# Patient Record
Sex: FEMALE | Race: AMERICAN INDIAN OR ALASKA NATIVE
[De-identification: names, ages, dates, MRNs, and addresses within clinical notes are randomized per-mention and may not be internally consistent; named-entity substitution may affect disease eponyms.]

---

## 2017-06-20 ENCOUNTER — HOSPITAL ENCOUNTER (EMERGENCY)
Dept: HOSPITAL 5 - ED | Age: 67
Discharge: LEFT BEFORE DISCHARGE | End: 2017-06-20
Payer: MEDICARE

## 2017-06-20 VITALS — DIASTOLIC BLOOD PRESSURE: 101 MMHG | SYSTOLIC BLOOD PRESSURE: 155 MMHG

## 2017-06-20 DIAGNOSIS — Z53.21: ICD-10-CM

## 2017-06-20 DIAGNOSIS — M54.9: Primary | ICD-10-CM

## 2017-06-20 PROCEDURE — 93005 ELECTROCARDIOGRAM TRACING: CPT

## 2017-06-20 PROCEDURE — 93010 ELECTROCARDIOGRAM REPORT: CPT

## 2017-06-20 NOTE — EMERGENCY DEPARTMENT REPORT
Chief Complaint: Back Pain/Injury


Stated Complaint: BACK PAIN


Time Seen by Provider: 06/20/17 14:24





- HPI


History of Present Illness: 





PT reports left sided back pain x a few days.  PT also requesting that an area 

in her "privates" be checked.  





- ROS


Review of Systems: 





+ back pain


- dysuria 





- Exam


Physical Exam: 





PT looks well, non toxic


steady gait


no vertebral tenderness, no cva tenderness danielle 


MSE screening note: 


Focused history and physical exam performed.


Due to findings the following was ordered:











ED Disposition for MSE


Condition: Stable

## 2017-06-29 ENCOUNTER — HOSPITAL ENCOUNTER (EMERGENCY)
Dept: HOSPITAL 5 - ED | Age: 67
LOS: 1 days | Discharge: PSYCHIATRIC OR SUBSTANCE USE FACILITY | End: 2017-06-30
Payer: MEDICARE

## 2017-06-29 DIAGNOSIS — F31.9: ICD-10-CM

## 2017-06-29 DIAGNOSIS — R44.0: ICD-10-CM

## 2017-06-29 DIAGNOSIS — I10: ICD-10-CM

## 2017-06-29 DIAGNOSIS — F20.9: ICD-10-CM

## 2017-06-29 DIAGNOSIS — F23: Primary | ICD-10-CM

## 2017-06-29 PROCEDURE — 80048 BASIC METABOLIC PNL TOTAL CA: CPT

## 2017-06-29 PROCEDURE — 85025 COMPLETE CBC W/AUTO DIFF WBC: CPT

## 2017-06-29 PROCEDURE — 36415 COLL VENOUS BLD VENIPUNCTURE: CPT

## 2017-06-29 PROCEDURE — G0480 DRUG TEST DEF 1-7 CLASSES: HCPCS

## 2017-06-29 PROCEDURE — 81001 URINALYSIS AUTO W/SCOPE: CPT

## 2017-06-29 PROCEDURE — 80320 DRUG SCREEN QUANTALCOHOLS: CPT

## 2017-06-29 PROCEDURE — 80307 DRUG TEST PRSMV CHEM ANLYZR: CPT

## 2017-06-29 PROCEDURE — 99284 EMERGENCY DEPT VISIT MOD MDM: CPT

## 2017-06-30 VITALS — SYSTOLIC BLOOD PRESSURE: 124 MMHG | DIASTOLIC BLOOD PRESSURE: 76 MMHG

## 2017-06-30 LAB
ANION GAP SERPL CALC-SCNC: 20 MMOL/L
BASOPHILS NFR BLD AUTO: 0.7 % (ref 0–1.8)
BILIRUB UR QL STRIP: (no result)
BLOOD UR QL VISUAL: (no result)
BUN SERPL-MCNC: 36 MG/DL (ref 7–17)
BUN/CREAT SERPL: 32.72 %
CALCIUM SERPL-MCNC: 9.7 MG/DL (ref 8.4–10.2)
CHLORIDE SERPL-SCNC: 105.4 MMOL/L (ref 98–107)
CO2 SERPL-SCNC: 24 MMOL/L (ref 22–30)
EOSINOPHIL NFR BLD AUTO: 2.7 % (ref 0–4.3)
GLUCOSE SERPL-MCNC: 83 MG/DL (ref 65–100)
HCT VFR BLD CALC: 34.5 % (ref 30.3–42.9)
HGB BLD-MCNC: 11.4 GM/DL (ref 10.1–14.3)
KETONES UR STRIP-MCNC: 20 MG/DL
LEUKOCYTE ESTERASE UR QL STRIP: (no result)
MCH RBC QN AUTO: 31 PG (ref 28–32)
MCHC RBC AUTO-ENTMCNC: 33 % (ref 30–34)
MCV RBC AUTO: 95 FL (ref 79–97)
MUCOUS THREADS #/AREA URNS HPF: (no result) /HPF
NITRITE UR QL STRIP: (no result)
PH UR STRIP: 5 [PH] (ref 5–7)
PLATELET # BLD: 211 K/MM3 (ref 140–440)
POTASSIUM SERPL-SCNC: 4.2 MMOL/L (ref 3.6–5)
PROT UR STRIP-MCNC: (no result) MG/DL
RBC # BLD AUTO: 3.66 M/MM3 (ref 3.65–5.03)
RBC #/AREA URNS HPF: 2 /HPF (ref 0–6)
SODIUM SERPL-SCNC: 145 MMOL/L (ref 137–145)
URINE DRUGS OF ABUSE NOTE: (no result)
UROBILINOGEN UR-MCNC: 2 MG/DL (ref ?–2)
WBC # BLD AUTO: 5.3 K/MM3 (ref 4.5–11)
WBC #/AREA URNS HPF: 13 /HPF (ref 0–6)

## 2017-06-30 NOTE — EMERGENCY DEPARTMENT REPORT
ED Psych HPI





- General


Chief Complaint: Psych


Stated Complaint: MH EVAL/HIGH BP


Time Seen by Provider: 06/30/17 02:53


Source: patient


Mode of arrival: Ambulatory





- History of Present Illness


Initial Comments: 





Pt is a 67 yr old female with a h/o schizophrenia and bipolar disorder 

noncompliant on meds presenting with acute psychosis with daughter. Currently 

the patient lives alone and has been hearing voices, which she reports is the 

devil, telling her to do things and telling her the devil is coming for her 

children. As per daughter the daughter has been wandering around outside for 

hours and recently called her daughter at 5am and left the house b/c the devil 

told her to leave the house. Pt denies any SI/HI. Pt lives alone and 30 minutes 

from her daughter. Pt has no other complaints





- Related Data


 Allergies











Allergy/AdvReac Type Severity Reaction Status Date / Time


 


No Known Allergies Allergy   Verified 06/20/17 14:29














ED Review of Systems


ROS: 


Stated complaint: MH EVAL/HIGH BP


Other details as noted in HPI





Comment: All other systems reviewed and negative





ED Past Medical Hx





- Past Medical History


Previous Medical History?: Yes


Hx Hypertension: Yes


Hx Psychiatric Treatment: Yes (SCHIZO/BIPOLAR)





- Surgical History


Past Surgical History?: No





- Social History


Smoking Status: Never Smoker


Substance Use Type: None





ED Physical Exam





- General


Limitations: No Limitations


General appearance: alert, in no apparent distress





- Head


Head exam: Present: atraumatic, normocephalic





- Eye


Eye exam: Present: normal appearance





- ENT


ENT exam: Present: mucous membranes moist





- Neck


Neck exam: Present: normal inspection





- Respiratory


Respiratory exam: Present: normal lung sounds bilaterally.  Absent: respiratory 

distress





- Cardiovascular


Cardiovascular Exam: Present: regular rate, normal rhythm.  Absent: systolic 

murmur, diastolic murmur, rubs, gallop





- GI/Abdominal


GI/Abdominal exam: Present: soft, normal bowel sounds





- Extremities Exam


Extremities exam: Present: normal inspection





- Back Exam


Back exam: Present: normal inspection





- Neurological Exam


Neurological exam: Present: alert, oriented X3





- Psychiatric


Psychiatric exam: Present: normal mood, flat affect, other (hearing voices, 

acutely psychotic)





- Skin


Skin exam: Present: warm, dry, intact, normal color.  Absent: rash





ED Course





 Vital Signs











  06/29/17





  23:08


 


Temperature 98.1 F


 


Pulse Rate 65


 


Respiratory 18





Rate 


 


Blood Pressure 131/87


 


O2 Sat by Pulse 98





Oximetry 














ED Medical Decision Making





- Lab Data


Result diagrams: 


 06/30/17 00:10





 06/30/17 00:10





- Medical Decision Making





Pt made 1013


Critical care attestation.: 


If time is entered above; I have spent that time in minutes in the direct care 

of this critically ill patient, excluding procedure time.








ED Disposition


Condition: Stable


Referrals: 


PRIMARY CARE,MD [Primary Care Provider] - 3-5 Days

## 2019-06-28 ENCOUNTER — HOSPITAL ENCOUNTER (INPATIENT)
Dept: HOSPITAL 5 - ED | Age: 69
LOS: 3 days | Discharge: HOME | DRG: 308 | End: 2019-07-01
Attending: INTERNAL MEDICINE | Admitting: INTERNAL MEDICINE
Payer: MEDICARE

## 2019-06-28 DIAGNOSIS — N18.9: ICD-10-CM

## 2019-06-28 DIAGNOSIS — I12.9: ICD-10-CM

## 2019-06-28 DIAGNOSIS — Z86.73: ICD-10-CM

## 2019-06-28 DIAGNOSIS — Z96.659: ICD-10-CM

## 2019-06-28 DIAGNOSIS — R00.1: ICD-10-CM

## 2019-06-28 DIAGNOSIS — I48.91: Primary | ICD-10-CM

## 2019-06-28 DIAGNOSIS — N17.0: ICD-10-CM

## 2019-06-28 DIAGNOSIS — F31.9: ICD-10-CM

## 2019-06-28 DIAGNOSIS — R07.9: ICD-10-CM

## 2019-06-28 LAB
APTT BLD: 24.4 SEC. (ref 24.2–36.6)
BASOPHILS # (AUTO): 0 K/MM3 (ref 0–0.1)
BASOPHILS # (AUTO): 0 K/MM3 (ref 0–0.1)
BASOPHILS NFR BLD AUTO: 0.2 % (ref 0–1.8)
BASOPHILS NFR BLD AUTO: 0.4 % (ref 0–1.8)
BUN SERPL-MCNC: 16 MG/DL (ref 7–17)
BUN SERPL-MCNC: 18 MG/DL (ref 7–17)
BUN/CREAT SERPL: 11 %
BUN/CREAT SERPL: 11 %
CALCIUM SERPL-MCNC: 9.3 MG/DL (ref 8.4–10.2)
CALCIUM SERPL-MCNC: 9.8 MG/DL (ref 8.4–10.2)
EOSINOPHIL # BLD AUTO: 0 K/MM3 (ref 0–0.4)
EOSINOPHIL # BLD AUTO: 0 K/MM3 (ref 0–0.4)
EOSINOPHIL NFR BLD AUTO: 0 % (ref 0–4.3)
EOSINOPHIL NFR BLD AUTO: 0.2 % (ref 0–4.3)
HCT VFR BLD CALC: 30.7 % (ref 30.3–42.9)
HCT VFR BLD CALC: 33.9 % (ref 30.3–42.9)
HDLC SERPL-MCNC: 50 MG/DL (ref 40–59)
HEMOLYSIS INDEX: 26
HEMOLYSIS INDEX: 40
HGB BLD-MCNC: 11 GM/DL (ref 10.1–14.3)
HGB BLD-MCNC: 11.7 GM/DL (ref 10.1–14.3)
INR PPP: 1.06 (ref 0.87–1.13)
LYMPHOCYTES # BLD AUTO: 0.8 K/MM3 (ref 1.2–5.4)
LYMPHOCYTES # BLD AUTO: 1.2 K/MM3 (ref 1.2–5.4)
LYMPHOCYTES NFR BLD AUTO: 10.1 % (ref 13.4–35)
LYMPHOCYTES NFR BLD AUTO: 13.4 % (ref 13.4–35)
MCHC RBC AUTO-ENTMCNC: 34 % (ref 30–34)
MCHC RBC AUTO-ENTMCNC: 36 % (ref 30–34)
MCV RBC AUTO: 94 FL (ref 79–97)
MCV RBC AUTO: 94 FL (ref 79–97)
MONOCYTES # (AUTO): 0.5 K/MM3 (ref 0–0.8)
MONOCYTES # (AUTO): 0.6 K/MM3 (ref 0–0.8)
MONOCYTES % (AUTO): 6.1 % (ref 0–7.3)
MONOCYTES % (AUTO): 6.1 % (ref 0–7.3)
PLATELET # BLD: 257 K/MM3 (ref 140–440)
PLATELET # BLD: 291 K/MM3 (ref 140–440)
RBC # BLD AUTO: 3.28 M/MM3 (ref 3.65–5.03)
RBC # BLD AUTO: 3.62 M/MM3 (ref 3.65–5.03)
T4 FREE SERPL-MCNC: 1.14 NG/DL (ref 0.76–1.46)

## 2019-06-28 PROCEDURE — 93010 ELECTROCARDIOGRAM REPORT: CPT

## 2019-06-28 PROCEDURE — 83880 ASSAY OF NATRIURETIC PEPTIDE: CPT

## 2019-06-28 PROCEDURE — 36415 COLL VENOUS BLD VENIPUNCTURE: CPT

## 2019-06-28 PROCEDURE — 93005 ELECTROCARDIOGRAM TRACING: CPT

## 2019-06-28 PROCEDURE — 83735 ASSAY OF MAGNESIUM: CPT

## 2019-06-28 PROCEDURE — 82550 ASSAY OF CK (CPK): CPT

## 2019-06-28 PROCEDURE — 78452 HT MUSCLE IMAGE SPECT MULT: CPT

## 2019-06-28 PROCEDURE — 80061 LIPID PANEL: CPT

## 2019-06-28 PROCEDURE — 71045 X-RAY EXAM CHEST 1 VIEW: CPT

## 2019-06-28 PROCEDURE — 80048 BASIC METABOLIC PNL TOTAL CA: CPT

## 2019-06-28 PROCEDURE — 84443 ASSAY THYROID STIM HORMONE: CPT

## 2019-06-28 PROCEDURE — A9502 TC99M TETROFOSMIN: HCPCS

## 2019-06-28 PROCEDURE — 93017 CV STRESS TEST TRACING ONLY: CPT

## 2019-06-28 PROCEDURE — 96365 THER/PROPH/DIAG IV INF INIT: CPT

## 2019-06-28 PROCEDURE — 85610 PROTHROMBIN TIME: CPT

## 2019-06-28 PROCEDURE — 82962 GLUCOSE BLOOD TEST: CPT

## 2019-06-28 PROCEDURE — 93306 TTE W/DOPPLER COMPLETE: CPT

## 2019-06-28 PROCEDURE — 70450 CT HEAD/BRAIN W/O DYE: CPT

## 2019-06-28 PROCEDURE — 82553 CREATINE MB FRACTION: CPT

## 2019-06-28 PROCEDURE — 84484 ASSAY OF TROPONIN QUANT: CPT

## 2019-06-28 PROCEDURE — 85025 COMPLETE CBC W/AUTO DIFF WBC: CPT

## 2019-06-28 PROCEDURE — 84439 ASSAY OF FREE THYROXINE: CPT

## 2019-06-28 PROCEDURE — 85730 THROMBOPLASTIN TIME PARTIAL: CPT

## 2019-06-28 RX ADMIN — ACETAMINOPHEN PRN MG: 325 TABLET ORAL at 20:30

## 2019-06-28 RX ADMIN — Medication SCH ML: at 22:04

## 2019-06-28 RX ADMIN — FAMOTIDINE SCH MG: 20 TABLET ORAL at 21:59

## 2019-06-28 NOTE — XRAY REPORT
PROCEDURE: Chest. 

 

TECHNIQUE: Portable AP view. 

 

HISTORY: Chest pain. 

 

COMPARISONS: None. 

 

FINDINGS: 

 

The heart size is normal. There is mild tortuosity and calcification in the thoracic aorta. The lungs
 are clear and well expanded. There are no pleural effusions. The soft tissues and regional skeleton 
are unremarkable. 

 

IMPRESSION:  No evidence of acute disease. 

 

This document is electronically signed by Blaine Arambula MD., June 28 2019 07:05:08 PM ET

## 2019-06-28 NOTE — EMERGENCY DEPARTMENT REPORT
HPI





- General


Chief Complaint: Chest Pain


Time Seen by Provider: 06/28/19 17:02





- HPI


HPI: 





Room 22








The patient is a 69-year-old female presented with a chief complaint of chest 

pain and palpitations.  The patient states prior to arrival she developed bl

urred vision and bilateral shoulder pain.  Patient states then developed sharp 

chest pain associated with palpitations and shortness of breath.  Patient admits

to diaphoresis with her chest pain but denies nausea or vomiting.  Patient 

admits to headache but denies paresthesia.  Patient also complains of cramping 

in both legs.








Location: [See above]


Duration: [See above]


Quality:  [See above]


Severity:  [See above]


Modifying factors: [see above]


Context: [see above]


Mode of transportation: [not driving]





ED Past Medical Hx





- Past Medical History


Previous Medical History?: Yes


Hx Hypertension: Yes


Hx Psychiatric Treatment: Yes (SCHIZO/BIPOLAR)





- Surgical History


Additional Surgical History: Uterine prolapse repair





- Family History


Family history: no significant





- Social History


Smoking Status: Never Smoker


Substance Use Type: None





- Medications


Home Medications: 


                                Home Medications











 Medication  Instructions  Recorded  Confirmed  Last Taken  Type


 


Unobtainable  06/30/17 06/30/17 Unknown History














ED Review of Systems


ROS: 


Stated complaint: CHEST PAIN


Other details as noted in HPI





Constitutional: diaphoresis


Eyes: vision change


ENT: denies: throat pain


Respiratory: shortness of breath


Cardiovascular: chest pain, palpitations


Endocrine: no symptoms reported


Gastrointestinal: abdominal pain.  denies: nausea, vomiting


Genitourinary: denies: dysuria


Musculoskeletal: arthralgia


Neurological: headache





Physical Exam





- Physical Exam


Vital Signs: 


                                   Vital Signs











  06/28/19





  16:55


 


Temperature 98.2 F


 


Pulse Rate 129 H


 


Respiratory 16





Rate 


 


Blood Pressure 110/78





[Right] 


 


O2 Sat by Pulse 98





Oximetry 











Physical Exam: 





GENERAL: The patient is well-developed well-nourished female lying on stretcher 

not appearing to be in acute distress. []


HEENT: Normocephalic.  Atraumatic.  Extraocular motions are intact.  Patient has

 moist mucous membranes.


NECK: Supple.  Trachea midline


CHEST/LUNGS: Clear to auscultation.  There is no respiratory distress noted.


HEART/CARDIOVASCULAR: Irregularly irregular.  There is tachycardia.  There is no

 gallop rub or murmur.


ABDOMEN: Abdomen is soft, nontender.  Patient has normal bowel sounds.  There is

 no abdominal distention.


SKIN: There is no rash.  There is no edema.  There is no diaphoresis.


NEURO: The patient is awake, alert, and oriented.  The patient is cooperative.  

The patient has no focal neurologic deficits.  The patient has normal speech.  

Cranial nerves II through XII grossly intact, no drift


MUSCULOSKELETAL: There is no evidence of acute injury.





ED Course


                                   Vital Signs











  06/28/19





  16:55


 


Temperature 98.2 F


 


Pulse Rate 129 H


 


Respiratory 16





Rate 


 


Blood Pressure 110/78





[Right] 


 


O2 Sat by Pulse 98





Oximetry 














ED Medical Decision Making





- Lab Data


Result diagrams: 


                                 06/28/19 17:35





                                 06/28/19 17:35





                                Laboratory Tests











  06/28/19 06/28/19 06/28/19





  17:35 17:35 17:35


 


WBC  8.2  


 


RBC  3.28 L  


 


Hgb  11.0  


 


Hct  30.7  


 


MCV  94  


 


MCH  34 H  


 


MCHC  36 H  


 


RDW  14.1  


 


Plt Count  257  


 


Lymph % (Auto)  10.1 L  


 


Mono % (Auto)  6.1  


 


Eos % (Auto)  0.0  


 


Baso % (Auto)  0.2  


 


Lymph #  0.8 L  


 


Mono #  0.5  


 


Eos #  0.0  


 


Baso #  0.0  


 


Seg Neutrophils %  83.6 H  


 


Seg Neutrophils #  6.9  


 


PT   13.5 


 


INR   1.06 


 


APTT   24.4 


 


Sodium    143


 


Potassium    3.6


 


Chloride    107.6 H


 


Carbon Dioxide    22


 


Anion Gap    17


 


BUN    18 H


 


Creatinine    1.7 H


 


Estimated GFR    36


 


BUN/Creatinine Ratio    11


 


Glucose    96


 


Calcium    9.3


 


Magnesium    1.60 L


 


Total Creatine Kinase    455 H


 


CK-MB (CK-2)    8.5 H


 


CK-MB (CK-2) Rel Index    1.8


 


Troponin T    0.065 H


 


NT-Pro-B Natriuret Pep    410.0


 


TSH   


 


Free T4   














  06/28/19





  17:35


 


WBC 


 


RBC 


 


Hgb 


 


Hct 


 


MCV 


 


MCH 


 


MCHC 


 


RDW 


 


Plt Count 


 


Lymph % (Auto) 


 


Mono % (Auto) 


 


Eos % (Auto) 


 


Baso % (Auto) 


 


Lymph # 


 


Mono # 


 


Eos # 


 


Baso # 


 


Seg Neutrophils % 


 


Seg Neutrophils # 


 


PT 


 


INR 


 


APTT 


 


Sodium 


 


Potassium 


 


Chloride 


 


Carbon Dioxide 


 


Anion Gap 


 


BUN 


 


Creatinine 


 


Estimated GFR 


 


BUN/Creatinine Ratio 


 


Glucose 


 


Calcium 


 


Magnesium 


 


Total Creatine Kinase 


 


CK-MB (CK-2) 


 


CK-MB (CK-2) Rel Index 


 


Troponin T 


 


NT-Pro-B Natriuret Pep 


 


TSH  1.900


 


Free T4  1.14














- EKG Data


-: EKG Interpreted by Me


Rate: tachycardia (143 bpm)





- EKG Data


When compared to previous EKG there are: previous EKG unavailable


Interpretation: other (atrial fibrillation with a rapid ventricular response)





06/28/19 18:48


EKG #2 reveals normal sinus rhythm at 82 bpm





- Radiology Data


Radiology results: image reviewed (chest x-ray)


interpreted by me: 





Chest x-ray-no focal infiltrates, no pneumothorax





- Differential Diagnosis


atrial fibrillation, ACS, pericarditis


Critical care attestation.: 


If time is entered above; I have spent that time in minutes in the direct care 

of this critically ill patient, excluding procedure time.








ED Disposition


Clinical Impression: 


 Chest pain, Atrial fibrillation with rapid ventricular response, Elevated 

troponin





Disposition: DC-09 OP ADMIT IP TO THIS HOSP


Is pt being admited?: Yes


Does the pt Need Aspirin: Yes


Condition: Fair


Instructions:  Chest Pain (ED)


Time of Disposition: 18:51 (hospitalist paged (Dr Perla))

## 2019-06-28 NOTE — HISTORY AND PHYSICAL REPORT
History of Present Illness


Date of examination: 06/28/19


Date of admission: 


06/28/2019


Chief complaint: 





Chest pain and palpitation


History of present illness: 





patient is a 69-year-old female with PMHx of hypertension, renal disease, 

stroke, bipolar disorder who presents to the ER with complaint of chest pain and

palpitations.  Patient states that her chest pain is an intermittent sharp pain 

associated with shortness of breath and palpitation, the pain is located on the 

midsternal area radiating to both shoulder and her back. She also reports 

blurred vision, seeing floaters and bilateral arm numbness and tingling and leg 

cramps. Patient reports diaphoresis and  headache, she denies nausea or 

vomiting, denies dizziness, denies headache, denies numbness.  In the ER 

patient's EKG showed A. fib with RVR, her heart rate range between, her symptoms

resolve, before the Cardizem drip started she converted to sinus rhythm patient 

was admitted for further evaluation and treatments.





Past History


Past Medical History: hypertension, stroke


Past Surgical History: No surgical history


Social history: no significant social history


Family history: no significant family history





Medications and Allergies


                                    Allergies











Allergy/AdvReac Type Severity Reaction Status Date / Time


 


No Known Allergies Allergy   Verified 06/20/17 14:29











                                Home Medications











 Medication  Instructions  Recorded  Confirmed  Last Taken  Type


 


Paliperidone Palmitate [Invega 117 mg IM [id number] 06/29/19 06/29/19 06/13/19 

History





Sustenna]     











Active Meds: 


Active Medications





Diltiazem HCl (Cardizem/D5w 100mg/100ml)  100 mg in 100 mls @ 5 mls/hr IV TITR 

YOSEF; Protocol











Review of Systems


Cardiovascular: chest pain, palpitations





Exam





- Constitutional


Vitals: 


                                        











Temp Pulse Resp BP Pulse Ox


 


 98.2 F   75   12   150/92   99 


 


 06/28/19 16:55  06/28/19 20:00  06/28/19 20:00  06/28/19 20:00  06/28/19 20:00











General appearance: Present: no acute distress





- EENT


Eyes: Present: EOM intact


ENT: hearing intact





- Respiratory


Respiratory: negative: CTA





- Cardiovascular


Rhythm: regular


Heart Sounds: Present: S1 & S2





- Extremities


Extremities: no ischemia, pulses intact, No edema, normal color, Full ROM


Peripheral Pulses: within normal limits





- Abdominal


General gastrointestinal: Present: non-tender, non-distended


Female genitourinary: Present: deferred





- Rectal


Rectal Exam: deferred





- Integumentary


Integumentary: Present: warm, dry





- Musculoskeletal


Musculoskeletal: strength equal bilaterally





- Psychiatric


Psychiatric: appropriate mood/affect





- Neurologic


Neurologic: moves all extremities





Results





- Labs


CBC & Chem 7: 


                                 06/28/19 20:31





                                 06/28/19 20:31


Labs: 


                             Laboratory Last Values











WBC  8.2 K/mm3 (4.5-11.0)   06/28/19  17:35    


 


RBC  3.28 M/mm3 (3.65-5.03)  L  06/28/19  17:35    


 


Hgb  11.0 gm/dl (10.1-14.3)   06/28/19  17:35    


 


Hct  30.7 % (30.3-42.9)   06/28/19  17:35    


 


MCV  94 fl (79-97)   06/28/19  17:35    


 


MCH  34 pg (28-32)  H  06/28/19  17:35    


 


MCHC  36 % (30-34)  H  06/28/19  17:35    


 


RDW  14.1 % (13.2-15.2)   06/28/19  17:35    


 


Plt Count  257 K/mm3 (140-440)   06/28/19  17:35    


 


Lymph % (Auto)  10.1 % (13.4-35.0)  L  06/28/19  17:35    


 


Mono % (Auto)  6.1 % (0.0-7.3)   06/28/19  17:35    


 


Eos % (Auto)  0.0 % (0.0-4.3)   06/28/19  17:35    


 


Baso % (Auto)  0.2 % (0.0-1.8)   06/28/19  17:35    


 


Lymph #  0.8 K/mm3 (1.2-5.4)  L  06/28/19  17:35    


 


Mono #  0.5 K/mm3 (0.0-0.8)   06/28/19  17:35    


 


Eos #  0.0 K/mm3 (0.0-0.4)   06/28/19  17:35    


 


Baso #  0.0 K/mm3 (0.0-0.1)   06/28/19  17:35    


 


Seg Neutrophils %  83.6 % (40.0-70.0)  H  06/28/19  17:35    


 


Seg Neutrophils #  6.9 K/mm3 (1.8-7.7)   06/28/19  17:35    


 


PT  13.5 Sec. (12.2-14.9)   06/28/19  17:35    


 


INR  1.06  (0.87-1.13)   06/28/19  17:35    


 


APTT  24.4 Sec. (24.2-36.6)   06/28/19  17:35    


 


Sodium  143 mmol/L (137-145)   06/28/19  17:35    


 


Potassium  3.6 mmol/L (3.6-5.0)   06/28/19  17:35    


 


Chloride  107.6 mmol/L ()  H  06/28/19  17:35    


 


Carbon Dioxide  22 mmol/L (22-30)   06/28/19  17:35    


 


  17 mmol/L  06/28/19  17:35    


 


BUN  18 mg/dL (7-17)  H  06/28/19  17:35    


 


  1.7 mg/dL (0.7-1.2)  H  06/28/19  17:35    


 


Estimated GFR  36 ml/min  06/28/19  17:35    


 


  11 %  06/28/19  17:35    


 


Glucose  96 mg/dL ()   06/28/19  17:35    


 


Calcium  9.3 mg/dL (8.4-10.2)   06/28/19  17:35    


 


Magnesium  1.60 mg/dL (1.7-2.3)  L  06/28/19  17:35    


 


  455 units/L ()  H  06/28/19  17:35    


 


CK-MB (CK-2)  8.5 ng/mL (0.0-4.0)  H  06/28/19  17:35    


 


CK-MB (CK-2) Rel Index  1.8  (0-4)   06/28/19  17:35    


 


  0.065 ng/mL (0.00-0.029)  H  06/28/19  17:35    


 


NT-Pro-B Natriuret Pep  410.0 pg/mL (0-900)   06/28/19  17:35    


 


Triglycerides  57 mg/dL (2-149)   06/28/19  17:35    


 


Cholesterol  135 mg/dL ()   06/28/19  17:35    


 


  83 mg/dL ()   06/28/19  17:35    


 


  50 mg/dL (40-59)   06/28/19  17:35    


 


  2.70 %  06/28/19  17:35    


 


TSH  1.900 mlU/mL (0.270-4.200)   06/28/19  17:35    


 


Free T4  1.14 ng/dL (0.76-1.46)   06/28/19  17:35    














Assessment and Plan


Assessment and plan: 





1.  A. fib with RVR (resolved)


2.  Chest pain (likely due to A. fib)


3.  Headache


4.  Accelerated Hypertension


5.  History of stroke


6.  History of kidney disease


7.  Bipolar disorder





Plan:


Patient is admitted to med telemetry for A. fib


Consult cardiology for evaluation


CT of the brain for headache


Hydralazine PRN for hypertension


Monitor neuro status


Resume home meds


DVT prophylaxis with Lovenox


Further plan per hospital course 


Advance Directives: Yes


Contraindication Mechanical VTE Prophylaxis: Contraindicated


Plan of care discussed with patient/family: Yes

## 2019-06-29 RX ADMIN — Medication SCH ML: at 10:55

## 2019-06-29 RX ADMIN — Medication SCH ML: at 22:01

## 2019-06-29 RX ADMIN — ENOXAPARIN SODIUM SCH MG: 100 INJECTION SUBCUTANEOUS at 10:55

## 2019-06-29 RX ADMIN — FAMOTIDINE SCH MG: 20 TABLET ORAL at 10:55

## 2019-06-29 RX ADMIN — FAMOTIDINE SCH MG: 20 TABLET ORAL at 22:01

## 2019-06-29 NOTE — PROGRESS NOTE
Assessment and Plan


Assessment and plan: 





A. fib with RVR.  Cont. tele monitoring.  F/U ECHO.  Cardiology following





Chest pain.  F/U stress test





Accelerated Hypertension.  Cont current antihypertensive meds





History of stroke





History of kidney disease





Bipolar disorder








History


Interval history: 





Ms. Bose is a 68 y/o  female with a history of 

hypertension, stroke and bipolar disorder who presented to the ED with chest 

pain she experienced after moving from one residence to another on Friday.   On 

arrival to the ED, she was in atrial fibrillation with RVR, which converted to 

SR after administration of diltiazem.  Her initial troponin was .065.





Hospitalist Physical





- Constitutional


Vitals: 


                                        











Temp Pulse Resp BP Pulse Ox


 


 97.7 F   61   18   160/94   100 


 


 06/29/19 11:17  06/29/19 11:17  06/29/19 11:17  06/29/19 11:17  06/29/19 11:17











General appearance: Present: no acute distress





- EENT


Eyes: Present: PERRL, EOM intact


ENT: hearing intact, clear oral mucosa, dentition normal





- Neck


Neck: Present: supple, normal ROM





- Respiratory


Respiratory effort: normal


Respiratory: bilateral: CTA





- Cardiovascular


Rhythm: regular


Heart Sounds: Present: S1 & S2.  Absent: gallop, rub





- Extremities


Extremities: no ischemia, No edema, Full ROM





- Abdominal


General gastrointestinal: soft, non-tender, non-distended, normal bowel sounds





- Integumentary


Integumentary: Present: clear, warm, dry





- Neurologic


Neurologic: CNII-XII intact, moves all extremities





Results





- Labs


CBC & Chem 7: 


                                 06/28/19 20:31





                                 06/28/19 20:31


Labs: 


                             Laboratory Last Values











WBC  9.3 K/mm3 (4.5-11.0)   06/28/19  20:31    


 


RBC  3.62 M/mm3 (3.65-5.03)  L  06/28/19  20:31    


 


Hgb  11.7 gm/dl (10.1-14.3)   06/28/19  20:31    


 


Hct  33.9 % (30.3-42.9)   06/28/19  20:31    


 


MCV  94 fl (79-97)   06/28/19  20:31    


 


MCH  32 pg (28-32)   06/28/19  20:31    


 


MCHC  34 % (30-34)   06/28/19  20:31    


 


RDW  14.0 % (13.2-15.2)   06/28/19  20:31    


 


Plt Count  291 K/mm3 (140-440)   06/28/19  20:31    


 


Lymph % (Auto)  13.4 % (13.4-35.0)   06/28/19  20:31    


 


Mono % (Auto)  6.1 % (0.0-7.3)   06/28/19  20:31    


 


Eos % (Auto)  0.2 % (0.0-4.3)   06/28/19  20:31    


 


Baso % (Auto)  0.4 % (0.0-1.8)   06/28/19  20:31    


 


Lymph #  1.2 K/mm3 (1.2-5.4)   06/28/19  20:31    


 


Mono #  0.6 K/mm3 (0.0-0.8)   06/28/19  20:31    


 


Eos #  0.0 K/mm3 (0.0-0.4)   06/28/19  20:31    


 


Baso #  0.0 K/mm3 (0.0-0.1)   06/28/19  20:31    


 


Seg Neutrophils %  79.9 % (40.0-70.0)  H  06/28/19  20:31    


 


Seg Neutrophils #  7.4 K/mm3 (1.8-7.7)   06/28/19  20:31    


 


PT  13.5 Sec. (12.2-14.9)   06/28/19  17:35    


 


INR  1.06  (0.87-1.13)   06/28/19  17:35    


 


APTT  24.4 Sec. (24.2-36.6)   06/28/19  17:35    


 


Sodium  142 mmol/L (137-145)   06/28/19  20:31    


 


Potassium  3.4 mmol/L (3.6-5.0)  L  06/28/19  20:31    


 


Chloride  104.8 mmol/L ()   06/28/19  20:31    


 


Carbon Dioxide  22 mmol/L (22-30)   06/28/19  20:31    


 


  19 mmol/L  06/28/19  20:31    


 


BUN  16 mg/dL (7-17)   06/28/19  20:31    


 


  1.4 mg/dL (0.7-1.2)  H  06/28/19  20:31    


 


Estimated GFR  45 ml/min  06/28/19  20:31    


 


  11 %  06/28/19  20:31    


 


Glucose  103 mg/dL ()  H  06/28/19  20:31    


 


Calcium  9.8 mg/dL (8.4-10.2)   06/28/19  20:31    


 


Magnesium  1.60 mg/dL (1.7-2.3)  L  06/28/19  17:35    


 


  455 units/L ()  H  06/28/19  17:35    


 


CK-MB (CK-2)  8.5 ng/mL (0.0-4.0)  H  06/28/19  17:35    


 


CK-MB (CK-2) Rel Index  1.8  (0-4)   06/28/19  17:35    


 


  0.065 ng/mL (0.00-0.029)  H  06/28/19  17:35    


 


NT-Pro-B Natriuret Pep  410.0 pg/mL (0-900)   06/28/19  17:35    


 


Triglycerides  57 mg/dL (2-149)   06/28/19  17:35    


 


Cholesterol  135 mg/dL ()   06/28/19  17:35    


 


  83 mg/dL ()   06/28/19  17:35    


 


  50 mg/dL (40-59)   06/28/19  17:35    


 


  2.70 %  06/28/19  17:35    


 


TSH  1.900 mlU/mL (0.270-4.200)   06/28/19  17:35    


 


Free T4  1.14 ng/dL (0.76-1.46)   06/28/19  17:35    














Active Medications





- Current Medications


Current Medications: 














Generic Name Dose Route Start Last Admin





  Trade Name Freq  PRN Reason Stop Dose Admin


 


Acetaminophen  650 mg  06/28/19 20:08  06/28/19 20:30





  Tylenol  PO   650 mg





  Q4H PRN   Administration





  Pain MILD(1-3)/Fever >100.5/HA  


 


Atorvastatin Calcium  20 mg  06/28/19 22:00  06/28/19 21:59





  Lipitor  PO   20 mg





  QHS YOSEF   Administration


 


Enoxaparin Sodium  40 mg  06/29/19 10:00  06/29/19 10:55





  Lovenox  SUB-Q   40 mg





  QDAY YOSEF   Administration


 


Famotidine  20 mg  06/28/19 22:00  06/29/19 10:55





  Pepcid  PO   20 mg





  BID YOSEF   Administration


 


Hydralazine HCl  10 mg  06/29/19 02:00 





  Apresoline  IV  





  Q4H PRN  





  Hypertension  


 


Diltiazem HCl  100 mg in 100 mls @ 5 mls/hr  06/28/19 18:00 





  Cardizem/D5w 100mg/100ml  IV  





  TITR YOSEF  





  Protocol  





  5 MG/HR  


 


Magnesium Hydroxide  30 ml  06/28/19 20:08 





  Milk Of Magnesia  PO  





  Q4H PRN  





  Constipation  


 


Nitroglycerin  0.4 mg  06/28/19 20:08 





  Nitrostat  SL  





  Q5M PRN  





  Chest Pain  


 


Ondansetron HCl  4 mg  06/28/19 20:08 





  Zofran  IV  





  Q8H PRN  





  Nausea And Vomiting  


 


Sodium Chloride  10 ml  06/28/19 22:00  06/29/19 10:55





  Sodium Chloride Flush Syringe 10 Ml  IV   10 ml





  BID YOSEF   Administration


 


Sodium Chloride  10 ml  06/28/19 20:08 





  Sodium Chloride Flush Syringe 10 Ml  IV  





  PRN PRN  





  LINE FLUSH  


 


Sodium Chloride  10 ml  06/28/19 20:08 





  Sodium Chloride Flush Syringe 10 Ml  IV  





  PRN PRN  





  LINE FLUSH

## 2019-06-29 NOTE — CONSULTATION
<AMANDA DUNBAR - Last Filed: 06/29/19 11:35>





History of Present Illness


Consult date: 06/29/19


Requesting physician: HURLINE [facility]


Consult reason: atrial fibrillation (with RVR)


History of present illness: 





Ms. Bose is a 68 y/o  female with a history of 

hypertension, stroke and bipolar disorder who presented to the ED with chest 

pain she experienced after moving from one residence to another on Friday.  She 

is previously unknown to our practice.  She describes the pain as 10/10 and 

midsternal with radiation to the back and across her chest.  She states the 

episode lasted only seconds, and since then, she has been free of chest pain.  

She also c/o headache and blurry vision that preceded her chest pain.  On 

arrival to the ED, she was in atrial fibrillation with RVR, which converted to 

SR after administration of diltiazem.  Her initial troponin was .065.





Past History


Past Medical History: hypertension, renal failure, stroke, other (bipolar 

disorder)


Past Surgical History: No surgical history, total knee replacement


Social history: no significant social history


Family history: no significant family history





Medications and Allergies


                                    Allergies











Allergy/AdvReac Type Severity Reaction Status Date / Time


 


No Known Allergies Allergy   Verified 06/20/17 14:29











                                Home Medications











 Medication  Instructions  Recorded  Confirmed  Last Taken  Type


 


Paliperidone Palmitate [Invega 117 mg IM [id number] 06/29/19 06/29/19 06/13/19 

History





Sustenna]     











Active Meds: 


Active Medications





Acetaminophen (Tylenol)  650 mg PO Q4H PRN


   PRN Reason: Pain MILD(1-3)/Fever >100.5/HA


   Last Admin: 06/28/19 20:30 Dose:  650 mg


   Documented by: 


Atorvastatin Calcium (Lipitor)  20 mg PO QHS [facility]


   Last Admin: 06/28/19 21:59 Dose:  20 mg


   Documented by: 


Enoxaparin Sodium (Lovenox)  40 mg SUB-Q QDAY [facility]


Famotidine (Pepcid)  20 mg PO BID [facility]


   Last Admin: 06/28/19 21:59 Dose:  20 mg


   Documented by: 


Hydralazine HCl (Apresoline)  10 mg IV Q4H PRN


   PRN Reason: Hypertension


Diltiazem HCl (Cardizem/D5w 100mg/100ml)  100 mg in 100 mls @ 5 mls/hr IV TITR 

YOSEF; Protocol


Magnesium Hydroxide (Milk Of Magnesia)  30 ml PO Q4H PRN


   PRN Reason: Constipation


Nitroglycerin (Nitrostat)  0.4 mg SL Q5M PRN


   PRN Reason: Chest Pain


Ondansetron HCl (Zofran)  4 mg IV Q8H PRN


   PRN Reason: Nausea And Vomiting


Sodium Chloride (Sodium Chloride Flush Syringe 10 Ml)  10 ml IV BID [facility]


   Last Admin: 06/28/19 22:04 Dose:  10 ml


   Documented by: 


Sodium Chloride (Sodium Chloride Flush Syringe 10 Ml)  10 ml IV PRN PRN


   PRN Reason: LINE FLUSH


Sodium Chloride (Sodium Chloride Flush Syringe 10 Ml)  10 ml IV PRN PRN


   PRN Reason: LINE FLUSH











Review of Systems


All systems: negative


Eyes: right: other (reports seeing "ball of hair" image)


Ears, nose, mouth and throat: deferred


Breasts: deferred (Denies chest pain )


Genitourinary Female: other (Deferred)


Menstruation: other (Deferred)


Rectal: other (Deferred)


Musculoskeletal: other


Neurological: spasticity (Vision changes )





Physical Examination


                                        


                                Last Vital Signs











Temp  97.8 F   06/29/19 07:19


 


Pulse  53 L  06/29/19 07:19


 


Resp  18   06/29/19 07:19


 


BP  152/83   06/29/19 07:19


 


Pulse Ox  98   06/29/19 07:19











General appearance: no acute distress


HEENT: Positive: PERRL


Cardiac: Positive: Reg Rate and Rhythm


Lungs: Positive: Normal Exam


Neuro: Positive: Grossly Intact


Abdomen: Positive: Unremarkable


Female genitourinary: deferred


Skin: Positive: Clear


Musculoskeletal: Normal Range of Motion





Results





                                 06/28/19 20:31





                                 06/28/19 20:31


                                 Cardiac Enzymes











  06/28/19 Range/Units





  17:35 


 


CK-MB (CK-2)  8.5 H  (0.0-4.0)  ng/mL








                                   Coagulation











  06/28/19 Range/Units





  17:35 


 


PT  13.5  (12.2-14.9)  Sec.


 


INR  1.06  (0.87-1.13)  


 


APTT  24.4  (24.2-36.6)  Sec.








                                     Lipids











  06/28/19 Range/Units





  17:35 


 


Triglycerides  57  (2-149)  mg/dL


 


Cholesterol  135  ()  mg/dL


 


HDL Cholesterol  50  (40-59)  mg/dL


 


Cholesterol/HDL Ratio  2.70  %








                                       CBC











  06/28/19 06/28/19 Range/Units





  17:35 20:31 


 


WBC  8.2  9.3  (4.5-11.0)  K/mm3


 


RBC  3.28 L  3.62 L  (3.65-5.03)  M/mm3


 


Hgb  11.0  11.7  (10.1-14.3)  gm/dl


 


Hct  30.7  33.9  (30.3-42.9)  %


 


Plt Count  257  291  (140-440)  K/mm3


 


Lymph #  0.8 L  1.2  (1.2-5.4)  K/mm3


 


Mono #  0.5  0.6  (0.0-0.8)  K/mm3


 


Eos #  0.0  0.0  (0.0-0.4)  K/mm3


 


Baso #  0.0  0.0  (0.0-0.1)  K/mm3








                          Comprehensive Metabolic Panel











  06/28/19 06/28/19 Range/Units





  17:35 20:31 


 


Sodium  143  142  (137-145)  mmol/L


 


Potassium  3.6  3.4 L  (3.6-5.0)  mmol/L


 


Chloride  107.6 H  104.8  ()  mmol/L


 


Carbon Dioxide  22  22  (22-30)  mmol/L


 


BUN  18 H  16  (7-17)  mg/dL


 


Creatinine  1.7 H  1.4 H  (0.7-1.2)  mg/dL


 


Glucose  96  103 H  ()  mg/dL


 


Calcium  9.3  9.8  (8.4-10.2)  mg/dL














- Imaging and Cardiology


Echo: pending





EKG interpretations





- Telemetry


EKG Rhythm: Sinus Rhythm





- EKG


Supraventricular dysrhythmia: atrial fibrillation (Initial EKG - AF with RVR)





Assessment and Plan


Patient current free of chest pain and remains in sinus rhythm.  Continue 

current cardiac management.  








Patient has been seen in conjunction with Dr. Gilman, who agrees with assessm

ent and plan.





<MARLENE GILMAN - Last Filed: 06/29/19 11:52>





Medications and Allergies


Active Meds: 


Active Medications





Acetaminophen (Tylenol)  650 mg PO Q4H PRN


   PRN Reason: Pain MILD(1-3)/Fever >100.5/HA


   Last Admin: 06/28/19 20:30 Dose:  650 mg


   Documented by: 


Atorvastatin Calcium (Lipitor)  20 mg PO QHS [facility]


   Last Admin: 06/28/19 21:59 Dose:  20 mg


   Documented by: 


Enoxaparin Sodium (Lovenox)  40 mg SUB-Q QDAY [facility]


   Last Admin: 06/29/19 10:55 Dose:  40 mg


   Documented by: 


Famotidine (Pepcid)  20 mg PO BID [facility]


   Last Admin: 06/29/19 10:55 Dose:  20 mg


   Documented by: 


Hydralazine HCl (Apresoline)  10 mg IV Q4H PRN


   PRN Reason: Hypertension


Diltiazem HCl (Cardizem/D5w 100mg/100ml)  100 mg in 100 mls @ 5 mls/hr IV TITR S

[facility]; Protocol


Magnesium Hydroxide (Milk Of Magnesia)  30 ml PO Q4H PRN


   PRN Reason: Constipation


Nitroglycerin (Nitrostat)  0.4 mg SL Q5M PRN


   PRN Reason: Chest Pain


Ondansetron HCl (Zofran)  4 mg IV Q8H PRN


   PRN Reason: Nausea And Vomiting


Sodium Chloride (Sodium Chloride Flush Syringe 10 Ml)  10 ml IV BID [facility]


   Last Admin: 06/29/19 10:55 Dose:  10 ml


   Documented by: 


Sodium Chloride (Sodium Chloride Flush Syringe 10 Ml)  10 ml IV PRN PRN


   PRN Reason: LINE FLUSH


Sodium Chloride (Sodium Chloride Flush Syringe 10 Ml)  10 ml IV PRN PRN


   PRN Reason: LINE FLUSH











Physical Examination


                                   Vital Signs











Pulse Ox


 


 98 


 


 06/28/19 16:24














Results





                                 06/28/19 20:31





                                 06/28/19 20:31


                                 Cardiac Enzymes











  06/28/19 Range/Units





  17:35 


 


CK-MB (CK-2)  8.5 H  (0.0-4.0)  ng/mL








                                   Coagulation











  06/28/19 Range/Units





  17:35 


 


PT  13.5  (12.2-14.9)  Sec.


 


INR  1.06  (0.87-1.13)  


 


APTT  24.4  (24.2-36.6)  Sec.








                                     Lipids











  06/28/19 Range/Units





  17:35 


 


Triglycerides  57  (2-149)  mg/dL


 


Cholesterol  135  ()  mg/dL


 


HDL Cholesterol  50  (40-59)  mg/dL


 


Cholesterol/HDL Ratio  2.70  %








                                       CBC











  06/28/19 06/28/19 Range/Units





  17:35 20:31 


 


WBC  8.2  9.3  (4.5-11.0)  K/mm3


 


RBC  3.28 L  3.62 L  (3.65-5.03)  M/mm3


 


Hgb  11.0  11.7  (10.1-14.3)  gm/dl


 


Hct  30.7  33.9  (30.3-42.9)  %


 


Plt Count  257  291  (140-440)  K/mm3


 


Lymph #  0.8 L  1.2  (1.2-5.4)  K/mm3


 


Mono #  0.5  0.6  (0.0-0.8)  K/mm3


 


Eos #  0.0  0.0  (0.0-0.4)  K/mm3


 


Baso #  0.0  0.0  (0.0-0.1)  K/mm3








                          Comprehensive Metabolic Panel











  06/28/19 06/28/19 Range/Units





  17:35 20:31 


 


Sodium  143  142  (137-145)  mmol/L


 


Potassium  3.6  3.4 L  (3.6-5.0)  mmol/L


 


Chloride  107.6 H  104.8  ()  mmol/L


 


Carbon Dioxide  22  22  (22-30)  mmol/L


 


BUN  18 H  16  (7-17)  mg/dL


 


Creatinine  1.7 H  1.4 H  (0.7-1.2)  mg/dL


 


Glucose  96  103 H  ()  mg/dL


 


Calcium  9.3  9.8  (8.4-10.2)  mg/dL














Assessment and Plan





06/29/2019>patient with chest pain./back pain,helped her daughter move out of 

apartment presented with transient chest pain,atrial fib with rapid VR,converted

to S.R in ER,presently in S.R,chest pain improved.Will get pharmacologic stress 

MPI along with echo.Continue risk factor modification.patient may not be very 

compliant with her medication.

## 2019-06-30 RX ADMIN — FAMOTIDINE SCH MG: 20 TABLET ORAL at 21:21

## 2019-06-30 RX ADMIN — ENOXAPARIN SODIUM SCH MG: 100 INJECTION SUBCUTANEOUS at 10:22

## 2019-06-30 RX ADMIN — Medication SCH ML: at 10:22

## 2019-06-30 RX ADMIN — ACETAMINOPHEN PRN MG: 325 TABLET ORAL at 21:22

## 2019-06-30 RX ADMIN — Medication SCH ML: at 21:22

## 2019-06-30 RX ADMIN — FAMOTIDINE SCH MG: 20 TABLET ORAL at 10:22

## 2019-06-30 NOTE — PROGRESS NOTE
Assessment and Plan


Assessment and plan: 





A. fib with RVR.  Cont. tele monitoring.  F/U ECHO.  Cardiology following





Chest pain.  Stress test in am





Accelerated Hypertension.  Cont current antihypertensive meds





History of stroke





History of kidney disease





Bipolar disorder








History


Interval history: 





Ms. Bose is a 68 y/o  female with a history of 

hypertension, stroke and bipolar disorder who presented to the ED with chest 

pain she experienced after moving from one residence to another on Friday.   On 

arrival to the ED, she was in atrial fibrillation with RVR, which converted to 

SR after administration of diltiazem.  Her initial troponin was .065.





No new issues overnight





Hospitalist Physical





- Constitutional


Vitals: 


                                        











Temp Pulse Resp BP Pulse Ox


 


 98.0 F   68   20   151/89   99 


 


 06/30/19 16:22  06/30/19 16:22  06/30/19 16:22  06/30/19 16:22  06/30/19 16:22











General appearance: Present: no acute distress





- EENT


Eyes: Present: PERRL, EOM intact


ENT: hearing intact, clear oral mucosa, dentition normal





- Neck


Neck: Present: supple, normal ROM





- Respiratory


Respiratory effort: normal


Respiratory: bilateral: CTA





- Cardiovascular


Rhythm: regular


Heart Sounds: Present: S1 & S2.  Absent: gallop, rub





- Extremities


Extremities: no ischemia, No edema, Full ROM





- Abdominal


General gastrointestinal: soft, non-tender, non-distended, normal bowel sounds





- Integumentary


Integumentary: Present: clear, warm, dry





- Neurologic


Neurologic: CNII-XII intact, moves all extremities





Results





- Labs


CBC & Chem 7: 


                                 06/28/19 20:31





                                 06/28/19 20:31


Labs: 


                             Laboratory Last Values











WBC  9.3 K/mm3 (4.5-11.0)   06/28/19  20:31    


 


RBC  3.62 M/mm3 (3.65-5.03)  L  06/28/19  20:31    


 


Hgb  11.7 gm/dl (10.1-14.3)   06/28/19  20:31    


 


Hct  33.9 % (30.3-42.9)   06/28/19  20:31    


 


MCV  94 fl (79-97)   06/28/19  20:31    


 


MCH  32 pg (28-32)   06/28/19  20:31    


 


MCHC  34 % (30-34)   06/28/19  20:31    


 


RDW  14.0 % (13.2-15.2)   06/28/19  20:31    


 


Plt Count  291 K/mm3 (140-440)   06/28/19  20:31    


 


Lymph % (Auto)  13.4 % (13.4-35.0)   06/28/19  20:31    


 


Mono % (Auto)  6.1 % (0.0-7.3)   06/28/19  20:31    


 


Eos % (Auto)  0.2 % (0.0-4.3)   06/28/19  20:31    


 


Baso % (Auto)  0.4 % (0.0-1.8)   06/28/19  20:31    


 


Lymph #  1.2 K/mm3 (1.2-5.4)   06/28/19  20:31    


 


Mono #  0.6 K/mm3 (0.0-0.8)   06/28/19  20:31    


 


Eos #  0.0 K/mm3 (0.0-0.4)   06/28/19  20:31    


 


Baso #  0.0 K/mm3 (0.0-0.1)   06/28/19  20:31    


 


Seg Neutrophils %  79.9 % (40.0-70.0)  H  06/28/19  20:31    


 


Seg Neutrophils #  7.4 K/mm3 (1.8-7.7)   06/28/19  20:31    


 


PT  13.5 Sec. (12.2-14.9)   06/28/19  17:35    


 


INR  1.06  (0.87-1.13)   06/28/19  17:35    


 


APTT  24.4 Sec. (24.2-36.6)   06/28/19  17:35    


 


Sodium  142 mmol/L (137-145)   06/28/19  20:31    


 


Potassium  3.4 mmol/L (3.6-5.0)  L  06/28/19  20:31    


 


Chloride  104.8 mmol/L ()   06/28/19  20:31    


 


Carbon Dioxide  22 mmol/L (22-30)   06/28/19  20:31    


 


  19 mmol/L  06/28/19  20:31    


 


BUN  16 mg/dL (7-17)   06/28/19  20:31    


 


  1.4 mg/dL (0.7-1.2)  H  06/28/19  20:31    


 


Estimated GFR  45 ml/min  06/28/19  20:31    


 


  11 %  06/28/19  20:31    


 


Glucose  103 mg/dL ()  H  06/28/19  20:31    


 


Calcium  9.8 mg/dL (8.4-10.2)   06/28/19  20:31    


 


Magnesium  1.60 mg/dL (1.7-2.3)  L  06/28/19  17:35    


 


  455 units/L ()  H  06/28/19  17:35    


 


CK-MB (CK-2)  8.5 ng/mL (0.0-4.0)  H  06/28/19  17:35    


 


CK-MB (CK-2) Rel Index  1.8  (0-4)   06/28/19  17:35    


 


  0.065 ng/mL (0.00-0.029)  H  06/28/19  17:35    


 


NT-Pro-B Natriuret Pep  410.0 pg/mL (0-900)   06/28/19  17:35    


 


Triglycerides  57 mg/dL (2-149)   06/28/19  17:35    


 


Cholesterol  135 mg/dL ()   06/28/19  17:35    


 


  83 mg/dL ()   06/28/19  17:35    


 


  50 mg/dL (40-59)   06/28/19  17:35    


 


  2.70 %  06/28/19  17:35    


 


TSH  1.900 mlU/mL (0.270-4.200)   06/28/19  17:35    


 


Free T4  1.14 ng/dL (0.76-1.46)   06/28/19  17:35    














Active Medications





- Current Medications


Current Medications: 














Generic Name Dose Route Start Last Admin





  Trade Name Freq  PRN Reason Stop Dose Admin


 


Acetaminophen  650 mg  06/28/19 20:08  06/28/19 20:30





  Tylenol  PO   650 mg





  Q4H PRN   Administration





  Pain MILD(1-3)/Fever >100.5/HA  


 


Atorvastatin Calcium  20 mg  06/28/19 22:00  06/29/19 22:01





  Lipitor  PO   20 mg





  QHS [facility]F   Administration


 


Enoxaparin Sodium  40 mg  06/29/19 10:00  06/30/19 10:22





  Lovenox  SUB-Q   40 mg





  QDAY [facility]F   Administration


 


Famotidine  20 mg  06/28/19 22:00  06/30/19 10:22





  Pepcid  PO   20 mg





  BID [facility]F   Administration


 


Hydralazine HCl  10 mg  06/29/19 02:00 





  Apresoline  IV  





  Q4H PRN  





  Hypertension  


 


Diltiazem HCl  100 mg in 100 mls @ 5 mls/hr  06/28/19 18:00 





  Cardizem/D5w 100mg/100ml  IV  





  TITR [facility]F  





  Protocol  





  5 MG/HR  


 


Magnesium Hydroxide  30 ml  06/28/19 20:08 





  Milk Of Magnesia  PO  





  Q4H PRN  





  Constipation  


 


Nitroglycerin  0.4 mg  06/28/19 20:08  06/29/19 22:01





  Nitrostat  SL   0.4 mg





  Q5M PRN   Administration





  Chest Pain  


 


Ondansetron HCl  4 mg  06/28/19 20:08 





  Zofran  IV  





  Q8H PRN  





  Nausea And Vomiting  


 


Sodium Chloride  10 ml  06/28/19 22:00  06/30/19 10:22





  Sodium Chloride Flush Syringe 10 Ml  IV   10 ml





  BID YOSEF   Administration


 


Sodium Chloride  10 ml  06/28/19 20:08 





  Sodium Chloride Flush Syringe 10 Ml  IV  





  PRN PRN  





  LINE FLUSH  


 


Sodium Chloride  10 ml  06/28/19 20:08 





  Sodium Chloride Flush Syringe 10 Ml  IV  





  PRN PRN  





  LINE FLUSH

## 2019-06-30 NOTE — CAT SCAN REPORT
PROCEDURE:  CT HEAD/BRAIN WO CON 

 

TECHNIQUE:  Computerized tomography of the head was performed without contrast material.  

 

CT DOSE LENGTH PRODUCT:  805.4  mGycm 

 

HISTORY: Headache, blurry vision, seeing floaters 

 

COMPARISONS:  None . 

 

FINDINGS: 

 

The ventricles, cisterns and sulci are within normal limits. No intra parenchymal or extra-axial mass
, hemorrhage, or mass effect.  Gray and white-matter differentiation is within normal limits for jovany
ent age. Basal ganglia mineralization noted. 

 

Normal spherical shape of the globes.  No significant abnormality involving the imaged portions of th
e paranasal sinuses and mastoid air cells.  No skull or facial fracture visualized. 

 

IMPRESSION: 

 

No acute intracranial abnormality. Consider additional imaging for worsening/persistent symptoms. 

 

  

 

This document is electronically signed by Blaine Gaming MD., June 30 2019 09:09:27 AM ET

## 2019-06-30 NOTE — PROGRESS NOTE
Assessment and Plan





Await echo results, which may guide further recommendations.  Pharmacologic 

stress test scheduled for tomorrow AM.  Continue current cardiac management.





Patient has been seen in conjunction with Dr. Gilman, who agrees with 

assessment and plan.











- Patient Problems


(1) Atrial fibrillation with rapid ventricular response


Current Visit: Yes   Status: Acute   





(2) Chest pain


Current Visit: Yes   Status: Acute   





(3) Elevated troponin


Current Visit: Yes   Status: Acute   





(4) Chronic kidney disease


Current Visit: Yes   Status: Chronic   





Subjective


Date of service: 06/30/19


Interval history: 





Patient is sitting up in bed in NAD.  Reports one brief episode of left-sided 

chest pain rated as 5/10.  No other complaints.  No afib noted on telemetry, 

though rate did drop into 40s early this AM; otherwise, SR with PACs in 60s 

noted.  





Objective


                                        


                                Last Vital Signs











Temp  98.1 F   06/30/19 07:46


 


Pulse  59 L  06/30/19 07:46


 


Resp  20   06/30/19 07:46


 


BP  130/84   06/30/19 07:46


 


Pulse Ox  99   06/30/19 07:46














- Physical Examination


General: No Apparent Distress


HEENT: Positive: PERRL


Cardiac: Positive: Reg Rate and Rhythm


Neuro: Positive: Grossly Intact


Abdomen: Positive: Unremarkable


Skin: Positive: Clear


Musculoskeletal: Normal Range of Motion


Extremities: Present: normal





- Imaging and Cardiology


Stress echo: pending


Echo: pending





- Telemetry


EKG Rhythm: Sinus Rhythm (with occasional SB episodes)

## 2019-07-01 VITALS — DIASTOLIC BLOOD PRESSURE: 102 MMHG | SYSTOLIC BLOOD PRESSURE: 156 MMHG

## 2019-07-01 RX ADMIN — FAMOTIDINE SCH MG: 20 TABLET ORAL at 13:05

## 2019-07-01 RX ADMIN — Medication SCH ML: at 13:05

## 2019-07-01 NOTE — DISCHARGE SUMMARY
Providers





- Providers


Date of Admission: 


06/28/19 20:08





Attending physician: 


YANELI WILSON MD





                                        





06/28/19


Consult to Cardiac Rehabilitation [CONS] Routine 


   Reason For Exam: Phase I





06/28/19 20:09


Consult to Cardiology [CONS] Routine 


   Consulting Provider: JUAN JOSE RUSHING


   Reason For Exam: Afib with RVR











Primary care physician: 


PAIGE TOBIN








Hospitalization


Reason for admission: Chest pain, a fib, elevated troponin (chronic)


Condition: Stable


Pertinent studies: 





Stress test; negative for acute ischemia


Echo; preliminary result is normal


Hospital course: 





patient is a 69-year-old female with PMHx of hypertension, renal disease, 

stroke, bipolar disorder who presents to the ER with complaint of chest pain and

 palpitations.  Patient states that her chest pain is an intermittent sharp pain

 associated with shortness of breath and palpitation, the pain is located on the

 midsternal area radiating to both shoulder and her back. She also reports 

blurred vision, seeing floaters and bilateral arm numbness and tingling and leg 

cramps. Patient reports diaphoresis and  headache, she denies nausea or 

vomiting, denies dizziness, denies headache, denies numbness.  In the ER 

patient's EKG showed A. fib with RVR, her heart rate range between, her symptoms

 resolve, before the Cardizem drip started she converted to sinus rhythm patient

 was admitted for further evaluation and treatments.


  Patient was admitted to the floor and was placed on eliquis.  Patient has 

elevated troponin, discussed his cardiology and did stress test and echo which 

was unremarkable. Chest pain resolved. Patient has sinus bradycardia and no need

 of AV blocking agents. patient scheduled to see [facility] in 2weeks.  

Patient is hemodynamically stable at the time of discharge.  Appropriate 

medication scripts were given as of discharge.  Discussed the management plan 

with the patient her family member.


Disposition: DC-01 TO HOME OR SELFCARE


Time spent for discharge: 32 minutes





- Discharge Diagnoses


(1) Atrial fibrillation with rapid ventricular response


Status: Acute   





(2) Elevated troponin


Status: Acute   





(3) Transient atrial fibrillation


Status: Acute   





(4) HTN (hypertension)


Status: Chronic   





(5) History of CVA (cerebrovascular accident)


Status: Chronic   





(6) Chest pain


Status: Resolved   





(7) Acute kidney injury


Status: Acute   Comment: Likely due to vasomotor nephropathy   





Core Measure Documentation





- Palliative Care


Palliative Care/ Comfort Measures: Not Applicable





- Core Measures


Any of the following diagnoses?: none





Exam





- Physical Exam


Narrative exam: 





 Not in cardiopulmonary distress. 


 The patient appeared well nourished and normally developed.


 Vital signs as documented.


 Head exam is unremarkable.


 No scleral icterus .


 Neck is without jugular venous distension, thyromegaly, or carotid bruits. 


 Lungs are clear to auscultation.


Cardiac exam reveals regular rate and  Rhythm.


Abdominal exam reveals normal bowel sounds. 


Extremities are nonedematous and both femoral and pedal pulses are normal.


CNS: Alert and oriented 3.  No focal weakness.





- Constitutional


Vitals: 


                                        











Temp Pulse Resp BP Pulse Ox


 


 98.0 F   74   14   139/89   97 


 


 07/01/19 07:21  07/01/19 10:00  07/01/19 10:00  07/01/19 11:44  07/01/19 10:00














Plan


Activity: no restrictions


Weight Bearing Status: Full Weight Bearing


Diet: low salt


Follow up with: 


PAIGE TOBIN MD [Primary Care Provider] - 7 Days


Prescriptions: 


Apixaban [Eliquis] 5 mg PO Q12HR #60 tablet

## 2019-07-01 NOTE — PROGRESS NOTE
Assessment and Plan


S/p lexiscan MPI stress test this AM which was negative for ischemia, EF 70%. 

Echo pending. 


Although pt's episode of AFib appears transient in nature with no reoccurrence 

of AFib since initial presentation, pt may benefit from initiation of systemic 

AC in setting of high CHADS score. Will initiate Eliquis and consider 

discontinuation as OP if there is no evidence of reoccurrence of AFib in the 

future. 


Pt currently in sinus bradycardia and thus will not initiate any scheduled AV 

asa blocking agents at this time. 


Pending echo reveals no gross abnormalities pt may discharge home from 

cardiology standpoint.


Recommend pt follow up in our office with Dr. Gilman within 1-2 weeks of 

hospital discharge ([phone]). 





The patient has been seen in conjunction with Dr. Way who agrees with the 

assessment and plan of care.








- Patient Problems


(1) Transient atrial fibrillation


Current Visit: Yes   Status: Acute   





(2) Chest pain


Current Visit: Yes   Status: Resolved   





(3) Elevated troponin


Current Visit: Yes   Status: Acute   





(4) HTN (hypertension)


Current Visit: Yes   Status: Chronic   





(5) History of CVA (cerebrovascular accident)


Current Visit: Yes   Status: Chronic   





Subjective


Date of service: 07/01/19


Principal diagnosis: AFib; cp


Interval history: 


pt for stress test today. no current cardiac complaints. In SR/SB on telemetry 

overnight.








Objective





                                Last Vital Signs











Temp  98.0 F   07/01/19 07:21


 


Pulse  47 L  07/01/19 04:13


 


Resp  18   07/01/19 07:21


 


BP  148/76   07/01/19 07:21


 


Pulse Ox  100   07/01/19 07:48

















- Physical Examination


General: No Apparent Distress


HEENT: Positive: PERRL


Neck: Positive: neck supple, trachea midline


Cardiac: Positive: Reg Rate and Rhythm, S1/S2


Lungs: Positive: Decreased Breath Sounds


Neuro: Positive: Grossly Intact


Abdomen: Positive: Unremarkable


Skin: Positive: Clear


Musculoskeletal: Normal Range of Motion


Extremities: Present: normal





- Imaging and Cardiology


Pharmacologic stress test: pending


Echo: pending





- Telemetry


EKG Rhythm: Sinus Rhythm

## 2019-07-02 NOTE — TREADMILL REPORT
NUCLEAR CARDIAC IMAGING



INDICATION FOR PROCEDURE:  Chest pain.  Informed consent was obtained.



Vasodilator stress was achieved with the intravenous administration of 0.4 mg of

Lexiscan per protocol.  Rest and stress nuclear cardiac imaging was performed

following intravenous administration of technetium-99m Myoview per protocol. 

Images were acquired in a 180-degree arc from 45 degrees AMANDA to 45 degrees LPO. 

After data acquisition and reconstruction, the images were processed and

reoriented into the vertical long, horizontal long, and horizontal short axis

slices.  A polar color map of the horizontal short axis slices was generated and

reviewed.  The rotating planar images reviewed in cinematic format on the

computer console.



Gated SPECT imaging demonstrates a post-stress left ventricular ejection

fraction of 70% with normal wall motion.  Myocardial perfusion imaging

demonstrates no significant cavity change between stress and rest.  There are

apical and septal perfusion abnormalities noted on the stress acquisition. 

These defects; however, are more pronounced on the resting study.  No

significant reversible stress induced perfusion defects are seen.



Nuclear cardiac imaging demonstrates grossly normal post-stress left ventricular

systolic function. Although prior apical and septal myocardial infarction cannot

be definitely excluded, the imaging characteristics of these perfusion

abnormalities suggest that they are artifactual in origin.  A significant degree

of ischemia is not seen.





DD: 07/01/2019 15:16

DT: 07/02/2019 02:26

[facility]# [id number]  [id number]

STEPHEN/ADOLPH BROWN

## 2020-09-25 ENCOUNTER — HOSPITAL ENCOUNTER (EMERGENCY)
Dept: HOSPITAL 61 - ER | Age: 70
Discharge: HOME | End: 2020-09-25
Payer: COMMERCIAL

## 2020-09-25 VITALS — BODY MASS INDEX: 23.88 KG/M2 | WEIGHT: 143.3 LBS | HEIGHT: 65 IN

## 2020-09-25 VITALS
SYSTOLIC BLOOD PRESSURE: 144 MMHG | DIASTOLIC BLOOD PRESSURE: 76 MMHG | DIASTOLIC BLOOD PRESSURE: 76 MMHG | SYSTOLIC BLOOD PRESSURE: 144 MMHG

## 2020-09-25 DIAGNOSIS — R44.0: ICD-10-CM

## 2020-09-25 DIAGNOSIS — R06.02: Primary | ICD-10-CM

## 2020-09-25 DIAGNOSIS — I10: ICD-10-CM

## 2020-09-25 LAB
ALBUMIN SERPL-MCNC: 3.8 G/DL (ref 3.4–5)
ALP SERPL-CCNC: 110 U/L (ref 46–116)
ALT SERPL-CCNC: 21 U/L (ref 14–59)
ANION GAP SERPL CALC-SCNC: 9 MMOL/L (ref 6–14)
AST SERPL-CCNC: 23 U/L (ref 15–37)
BASOPHILS # BLD AUTO: 0.1 X10^3/UL (ref 0–0.2)
BASOPHILS NFR BLD: 1 % (ref 0–3)
BILIRUB DIRECT SERPL-MCNC: 0.2 MG/DL (ref 0–0.2)
BILIRUB SERPL-MCNC: 0.8 MG/DL (ref 0.2–1)
BUN SERPL-MCNC: 14 MG/DL (ref 7–20)
CALCIUM SERPL-MCNC: 9.7 MG/DL (ref 8.5–10.1)
CHLORIDE SERPL-SCNC: 105 MMOL/L (ref 98–107)
CO2 SERPL-SCNC: 27 MMOL/L (ref 21–32)
CREAT SERPL-MCNC: 1.3 MG/DL (ref 0.6–1)
EOSINOPHIL NFR BLD: 0.1 X10^3/UL (ref 0–0.7)
EOSINOPHIL NFR BLD: 1 % (ref 0–3)
ERYTHROCYTE [DISTWIDTH] IN BLOOD BY AUTOMATED COUNT: 14 % (ref 11.5–14.5)
GFR SERPLBLD BASED ON 1.73 SQ M-ARVRAT: 49 ML/MIN
GLUCOSE SERPL-MCNC: 84 MG/DL (ref 70–99)
HCT VFR BLD CALC: 35.4 % (ref 36–47)
HGB BLD-MCNC: 12.4 G/DL (ref 12–15.5)
LIPASE: 215 U/L (ref 73–393)
LYMPHOCYTES # BLD: 2.9 X10^3/UL (ref 1–4.8)
LYMPHOCYTES NFR BLD AUTO: 44 % (ref 24–48)
MCH RBC QN AUTO: 32 PG (ref 25–35)
MCHC RBC AUTO-ENTMCNC: 35 G/DL (ref 31–37)
MCV RBC AUTO: 93 FL (ref 79–100)
MONO #: 0.7 X10^3/UL (ref 0–1.1)
MONOCYTES NFR BLD: 11 % (ref 0–9)
NEUT #: 2.8 X10^3/UL (ref 1.8–7.7)
NEUTROPHILS NFR BLD AUTO: 43 % (ref 31–73)
PLATELET # BLD AUTO: 253 X10^3/UL (ref 140–400)
PLATELET # BLD EST: ADEQUATE 10*3/UL
PLATELET CLUMP: PRESENT
POTASSIUM SERPL-SCNC: 3.4 MMOL/L (ref 3.5–5.1)
PROT SERPL-MCNC: 8.2 G/DL (ref 6.4–8.2)
PROTHROMBIN TIME: 12.4 SEC (ref 11.7–14)
RBC # BLD AUTO: 3.82 X10^6/UL (ref 3.5–5.4)
SODIUM SERPL-SCNC: 141 MMOL/L (ref 136–145)
WBC # BLD AUTO: 6.6 X10^3/UL (ref 4–11)

## 2020-09-25 PROCEDURE — 85025 COMPLETE CBC W/AUTO DIFF WBC: CPT

## 2020-09-25 PROCEDURE — 80048 BASIC METABOLIC PNL TOTAL CA: CPT

## 2020-09-25 PROCEDURE — 71045 X-RAY EXAM CHEST 1 VIEW: CPT

## 2020-09-25 PROCEDURE — 84484 ASSAY OF TROPONIN QUANT: CPT

## 2020-09-25 PROCEDURE — 85610 PROTHROMBIN TIME: CPT

## 2020-09-25 PROCEDURE — 93005 ELECTROCARDIOGRAM TRACING: CPT

## 2020-09-25 PROCEDURE — 80076 HEPATIC FUNCTION PANEL: CPT

## 2020-09-25 PROCEDURE — 99285 EMERGENCY DEPT VISIT HI MDM: CPT

## 2020-09-25 PROCEDURE — 83690 ASSAY OF LIPASE: CPT

## 2020-09-25 PROCEDURE — 36415 COLL VENOUS BLD VENIPUNCTURE: CPT

## 2020-09-25 PROCEDURE — 83880 ASSAY OF NATRIURETIC PEPTIDE: CPT

## 2020-09-25 NOTE — RAD
CHEST AP ONLY

 

Clinical indications: Chest pain.

 

COMPARISON: None available.

 

Findings: No acute lung infiltrate or pleural effusion or pulmonary edema 

or lung mass or pneumothorax is seen.  The heart size, pulmonary 

vasculature, mediastinum and both jim are unremarkable. 

 

Impression: No acute radiographic abnormality is seen.

 

Electronically signed by: Israel Mitchell MD (9/25/2020 4:23 PM) [id number]

## 2020-09-25 NOTE — PHYS DOC
Past Medical History


Past Medical History:  Hypertension, Other


Additional Past Medical Histor:  auditory hallucinations


Past Surgical History:  Hysterectomy


Smoking Status:  Never Smoker


Alcohol Use:  None


Drug Use:  None





General Adult


EDM:


Chief Complaint:  Shortness of breath





HPI:


HPI:


70-year-old female presenting with shortness of breath started this morning.  No

alleviating or exacerbating factors.  Her shortness of breath is completely 

resolved when she is in the emergency department here.  She denies any exposure 

to COVID.  She denies any chest pain.  She is feeling much better now.  Onset 

today.  Location lungs.  Duration intermittent.  No alleviating factors.





Review of systems negative for chest pain abdominal pain diaphoresis difficulty 

breathing vomiting fevers or chills.  All other review of systems negative.





ED course: 70-year-old female who presented after having an episode of shortness

of breath earlier this morning and now is asymptomatic.  EKG obtained and 

reviewed by myself shows sinus rhythm with a regular rate.  ST segments c

ongruent.  Not suggestive of ACS.  CBC unremarkable.  Chemistry panel shows a 

mild elevation in the patient's creatinine which is chronic for the patient.  

Otherwise work-up is unremarkable.  Patient remains asymptomatic in the 

emergency department and would like to go home.  We will have her call [phone] if 

she develops symptoms or if she is concerned for any reason.





Heart Score:


Risk Factors:


Risk Factors:  DM, Current or recent (<one month) smoker, HTN, HLP, family 

history of CAD, obesity.


Risk Scores:


Score 0 - 3:  2.5% MACE over next 6 weeks - Discharge Home


Score 4 - 6:  20.3% MACE over next 6 weeks - Admit for Clinical Observation


Score 7 - 10:  72.7% MACE over next 6 weeks - Early Invasive Strategies





Allergies:


Allergies:





Allergies








Coded Allergies Type Severity Reaction Last Updated Verified


 


  No Known Drug Allergies    8/3/15 No











Physical Exam:


PE:





Constitutional: Well developed, well nourished, no acute distress, non-toxic 

appearance. []


HENT: Normocephalic, atraumatic, bilateral external ears normal, oropharynx 

moist, no oral exudates, nose normal. []


Eyes: PERRLA, EOMI, conjunctiva normal, no discharge. [] 


Neck: Normal range of motion, no tenderness, supple, no stridor. [] 


Cardiovascular:Heart rate regular rhythm, no murmur []


Lungs & Thorax:  Bilateral breath sounds clear to auscultation []


Abdomen: Bowel sounds normal, soft, no tenderness, no masses, no pulsatile 

masses. [] 


Skin: Warm, dry, no erythema, no rash. [] 


Back: No tenderness, no CVA tenderness. [] 


Extremities: No tenderness, no cyanosis, no clubbing, ROM intact, no edema. [] 


Neurologic: Alert and oriented X 3, normal motor function, normal sensory 

function, no focal deficits noted. []


Psychologic: Affect normal, judgement normal, mood normal. []





EKG:


EKG:


[]





Radiology/Procedures:


Radiology/Procedures:


[]





Course & Med Decision Making:


Course & Med Decision Making


Pertinent Labs and Imaging studies reviewed. (See chart for details)





[]





Dragon Disclaimer:


Dragon Disclaimer:


This electronic medical record was generated, in whole or in part, using a voice

 recognition dictation system.





Departure


Departure


Impression:  


   Primary Impression:  


   Encounter for medical screening examination


Disposition:  01 HOME, SELF-CARE


Condition:  STABLE


Referrals:  


DALY TEJADA MD (PCP)


Patient Instructions:  Shortness of Breath





Justicifation of Admission Dx:


Justifications for Admission:


Justification of Admission Dx:  No











RADHA KHAN MD               Sep 25, 2020 15:33

## 2020-09-28 NOTE — EKG
[facility]

              [address] [place] [address]

Test Date:    2020               Test Time:    15:23:36

Pat Name:     LIV BRICENO       Department:   

Patient ID:   [id number]           Room:          

Gender:       F                        Technician:   

:          1950               Requested By: RADHA KHAN

Order Number: [id number]           Reading MD:     

                                 Measurements

Intervals                              Axis          

Rate:         72                       P:            90

NM:           194                      QRS:          -51

QRSD:         80                       T:            94

QT:           378                                    

QTc:          415                                    

                           Interpretive Statements

SINUS RHYTHM

ATRIAL PREMATURE COMPLEX(ES)

ABNORMAL LEFT AXIS DEVIATION

LOW LIMB LEAD VOLTAGE

LEFT ANTERIOR FASCICULAR BLOCK

QRS(T) CONTOUR ABNORMALITY

CONSISTENT WITH ANTERIOR INFARCT

PROBABLY OLD

T ABNORMALITY IN HIGH LATERAL LEADS

ABNORMAL ECG

RI6.02

No previous ECG available for comparison

## 2021-01-25 ENCOUNTER — HOSPITAL ENCOUNTER (OUTPATIENT)
Dept: HOSPITAL 61 - ECHO | Age: 71
End: 2021-01-25
Attending: INTERNAL MEDICINE
Payer: MEDICARE

## 2021-01-25 DIAGNOSIS — I71.2: ICD-10-CM

## 2021-01-25 DIAGNOSIS — I08.1: Primary | ICD-10-CM

## 2021-01-25 DIAGNOSIS — E07.9: ICD-10-CM

## 2021-01-25 DIAGNOSIS — J98.11: ICD-10-CM

## 2021-01-25 DIAGNOSIS — I26.99: ICD-10-CM

## 2021-01-25 LAB
BUN SERPL-MCNC: 14 MG/DL (ref 7–20)
CREAT SERPL-MCNC: 1.3 MG/DL (ref 0.6–1)
GFR SERPLBLD BASED ON 1.73 SQ M-ARVRAT: 49 ML/MIN

## 2021-01-25 PROCEDURE — 71275 CT ANGIOGRAPHY CHEST: CPT

## 2021-01-25 PROCEDURE — 36415 COLL VENOUS BLD VENIPUNCTURE: CPT

## 2021-01-25 PROCEDURE — 82565 ASSAY OF CREATININE: CPT

## 2021-01-25 PROCEDURE — 84520 ASSAY OF UREA NITROGEN: CPT

## 2021-01-25 PROCEDURE — 93306 TTE W/DOPPLER COMPLETE: CPT

## 2021-01-25 NOTE — RAD
CTA of the chest without and with contrast 1/25/2021 



CLINICAL HISTORY: Possible aneurysm of the thoracic aorta seen on recent echocardiogram.



TECHNIQUE: Unenhanced, contiguous, 2 mm axial sections were obtained through the chest. After the int
ravenous administration of 75 cc of Omnipaque 350, contiguous, 0.625 mm axial sections were obtained 
through the chest. 3 mm reconstructed axial and 3-D MIP sagittal and coronal reconstructed images wer
e obtained. Additionally volume rendered 3-D reconstructed images of the thoracic aorta were obtained
.



One or more of the following individualized dose reduction techniques were utilized for this study:



1. Automated exposure control.

2. Adjustment of the mA and/or kV according to patient size.

3. Use of iterative reconstruction technique.





FINDINGS: Comparison is made to a chest radiograph dated 9/25/2020.



The unenhanced CT images demonstrate scattered atherosclerotic plaque formation involving the thoraci
c aorta. Scattered coronary artery calcifications are seen.



On the postcontrast images the thoracic aorta is tortuous but tapers normally. No aneurysm of the tho
racic aorta is seen. No intimal flap is seen. There is a common origin of the brachiocephalic and lef
t common carotid artery from the thoracic aortic arch. This is a normal variation. This origin is pat
ent. The origin of the left subclavian artery is patent. A 3.4 cm low-attenuation mass is seen involv
ing the right thyroid gland which has a nonspecific CT appearance.



Nonocclusive filling defects are seen involving the right upper lobe and right lower lobe pulmonary a
rteries and their branches consistent with pulmonary emboli.



Minimal dependent subsegmental atelectasis is seen involving both lungs. No area of consolidation is 
seen. No pneumothorax or pleural effusion is noted.



IMPRESSION:

1. No aneurysm of the thoracic aorta is seen.

2. Nonocclusive pulmonary emboli are seen scattered throughout the right lung.







A message regarding these findings was left on Dr. Mccall's assitant's voice mail.



Electronically signed by: Igor Santos MD (1/25/2021 2:21 PM) [id number]

## 2021-01-25 NOTE — CARD
MR#: [id number]

Account#: [id number]

Accession#: [id number]

Date of Study: 01/25/2021

Ordering Physician: SANYA GARDNER, 

Referring Physician: SANYA GARDNER, 

Tech: Torrie Daniels





--------------- APPROVED REPORT --------------





EXAM: Two-dimensional and M-mode echocardiogram with Doppler and color Doppler.



Other Information 

Quality : AverageHR: 80bpm



INDICATION

Murmur 



RISK FACTORS

Hypertension 



2D DIMENSIONS 

RVDd3.7 (2.9-3.5cm)Left Atrium(2D)3.5 (1.6-4.0cm)

IVSd1.0 (0.7-1.1cm)Aortic Root(2D)2.9 (2.0-3.7cm)

LVDd4.6 (3.9-5.9cm)LVOT Diameter2.0 (1.8-2.4cm)

PWd0.9 (0.7-1.1cm)LVDs3.3 (2.5-4.0cm)

FS (%) 28.0 %SV54.0 ml

LVEF(%)54.3 (>50%)



Aortic Valve

AoV Peak Jung.146.2cm/sAoV VTI26.4cm

AO Peak GR.8.5mmHgLVOT Peak Jung.101.4cm/s

LVOT  VTI 20.58cmAO Mean GR.4mmHg

TANISHA (VMAX)1.73qz9SBO   (VTI)2.46cm2



Mitral Valve

MV E Ttcyqlgs36.8cm/sMV E Peak Gr.123mmHg

MV DECEL [id number] A Vcrsvxvx57.1cm/s

MV E Mean Gr.2mmHgMV EVN10zy

E/A  Ratio0.7MVA (PHT)3.28cm2



TDI

E/Lateral E'11.7E/Medial E'15.7



Pulmonary Valve

PV Peak Bidnrxue82.8cm/sPV Peak Grad.4mmHg



Tricuspid Valve

TR P. Huxkarzh345cx/sRAP DANZETRG8cuKw

TR Peak Gr.87bhXrTCFS93lkJn



Pulmonary Vein

S1 Nxqzedwk43.8cm/sD2 Govvtnqk90.1cm/s

PVa lmiabswm713ejdv



 LEFT VENTRICLE 

The left ventricle is normal size. There is mild concentric left ventricular hypertrophy. The left ve
ntricular systolic function is normal and the ejection fraction is within normal range. The Ejection 
Fraction is 50-55%. There is normal LV segmental wall motion. Transmitral Doppler flow pattern is Gra
de I-abnormal relaxation pattern.



 RIGHT VENTRICLE 

The right ventricle is normal size. There is normal right ventricular wall thickness. The right ventr
icular systolic function is normal.



 ATRIA 

The left atrium size is normal. The right atrium size is normal. The interatrial septum is intact wit
h no evidence for an atrial septal defect or patent foramen ovale as noted on 2-D or Doppler imaging.




 AORTIC VALVE 

The aortic valve is normal in structure and function. Doppler and Color Flow revealed trace aortic re
gurgitation. There is no significant aortic valvular stenosis. Calculated aortic valve area is 2.66 c
m2 with maximum pressure gradient of 9 mmHg and mean pressure gradient of 4 mmHg.



 MITRAL VALVE 

The mitral valve is thickened but opens well. There is no evidence of mitral valve prolapse. There is
 no mitral valve stenosis. Doppler and Color-flow revealed mild mitral regurgitation.



 TRICUSPID VALVE 

The tricuspid valve is normal in structure and function. Doppler and Color Flow revealed mild tricusp
id regurgitation with an estimated PAP of 31 mmHg. There is no tricuspid valve stenosis.



 PULMONIC VALVE 

The pulmonic valve is not well visualized. Doppler and Color Flow revealed trace pulmonic valvular re
gurgitation.



 GREAT VESSELS 

The aortic root is normal in size. The ascending aorta is borderline dilated. The IVC is normal in si
ze and collapses >50% with inspiration.



 PERICARDIAL EFFUSION 

There is no evidence of significant pericardial effusion.



Critical Notification

Critical Value: No



<Conclusion>

The left ventricle is normal size.

The left ventricular systolic function is normal and the ejection fraction is within normal range.

The Ejection Fraction is 50-55%.

There is mild concentric left ventricular hypertrophy.

Doppler and Color Flow revealed trace aortic regurgitation.

There is no significant aortic valvular stenosis.

Doppler and Color-flow revealed mild mitral regurgitation.

Doppler and Color Flow revealed mild tricuspid regurgitation with an estimated PAP of 31 mmHg.



Signed by : Sanya Gardner MD

Electronically Approved : 01/25/2021 12:29:04

## 2021-08-25 ENCOUNTER — HOSPITAL ENCOUNTER (EMERGENCY)
Dept: HOSPITAL 61 - ER | Age: 71
Discharge: HOME | End: 2021-08-25
Payer: MEDICARE

## 2021-08-25 VITALS — BODY MASS INDEX: 27.88 KG/M2 | WEIGHT: 167.33 LBS | HEIGHT: 65 IN

## 2021-08-25 VITALS
SYSTOLIC BLOOD PRESSURE: 152 MMHG | SYSTOLIC BLOOD PRESSURE: 152 MMHG | DIASTOLIC BLOOD PRESSURE: 89 MMHG | DIASTOLIC BLOOD PRESSURE: 89 MMHG

## 2021-08-25 DIAGNOSIS — R06.02: ICD-10-CM

## 2021-08-25 DIAGNOSIS — Z20.822: ICD-10-CM

## 2021-08-25 DIAGNOSIS — R53.1: ICD-10-CM

## 2021-08-25 DIAGNOSIS — N39.0: Primary | ICD-10-CM

## 2021-08-25 DIAGNOSIS — I10: ICD-10-CM

## 2021-08-25 LAB
ALBUMIN SERPL-MCNC: 3.7 G/DL (ref 3.4–5)
ALBUMIN/GLOB SERPL: 0.8 {RATIO} (ref 1–1.7)
ALP SERPL-CCNC: 138 U/L (ref 46–116)
ALT SERPL-CCNC: 27 U/L (ref 14–59)
ANION GAP SERPL CALC-SCNC: 10 MMOL/L (ref 6–14)
APTT PPP: (no result) S
AST SERPL-CCNC: 27 U/L (ref 15–37)
BACTERIA #/AREA URNS HPF: (no result) /HPF
BASOPHILS # BLD AUTO: 0 X10^3/UL (ref 0–0.2)
BASOPHILS NFR BLD: 1 % (ref 0–3)
BILIRUB SERPL-MCNC: 0.7 MG/DL (ref 0.2–1)
BILIRUB UR QL STRIP: (no result)
BUN SERPL-MCNC: 18 MG/DL (ref 7–20)
BUN/CREAT SERPL: 12 (ref 6–20)
CALCIUM SERPL-MCNC: 9.5 MG/DL (ref 8.5–10.1)
CHLORIDE SERPL-SCNC: 101 MMOL/L (ref 98–107)
CO2 SERPL-SCNC: 24 MMOL/L (ref 21–32)
CREAT SERPL-MCNC: 1.5 MG/DL (ref 0.6–1)
EOSINOPHIL NFR BLD: 0.1 X10^3/UL (ref 0–0.7)
EOSINOPHIL NFR BLD: 1 % (ref 0–3)
ERYTHROCYTE [DISTWIDTH] IN BLOOD BY AUTOMATED COUNT: 13.8 % (ref 11.5–14.5)
FIBRINOGEN PPP-MCNC: CLEAR MG/DL
GFR SERPLBLD BASED ON 1.73 SQ M-ARVRAT: 41.4 ML/MIN
GLUCOSE SERPL-MCNC: 100 MG/DL (ref 70–99)
HCT VFR BLD CALC: 38.3 % (ref 36–47)
HGB BLD-MCNC: 13.1 G/DL (ref 12–15.5)
HYALINE CASTS #/AREA URNS LPF: (no result) /HPF
LYMPHOCYTES # BLD: 2.2 X10^3/UL (ref 1–4.8)
LYMPHOCYTES NFR BLD AUTO: 44 % (ref 24–48)
MCH RBC QN AUTO: 32 PG (ref 25–35)
MCHC RBC AUTO-ENTMCNC: 34 G/DL (ref 31–37)
MCV RBC AUTO: 93 FL (ref 79–100)
MONO #: 0.5 X10^3/UL (ref 0–1.1)
MONOCYTES NFR BLD: 10 % (ref 0–9)
NEUT #: 2.2 X10^3/UL (ref 1.8–7.7)
NEUTROPHILS NFR BLD AUTO: 44 % (ref 31–73)
NITRITE UR QL STRIP: NEGATIVE
PH UR STRIP: 5.5 [PH]
PLATELET # BLD AUTO: 302 X10^3/UL (ref 140–400)
POTASSIUM SERPL-SCNC: 3.2 MMOL/L (ref 3.5–5.1)
PROT SERPL-MCNC: 8.3 G/DL (ref 6.4–8.2)
PROT UR STRIP-MCNC: 100 MG/DL
RBC # BLD AUTO: 4.09 X10^6/UL (ref 3.5–5.4)
RBC #/AREA URNS HPF: (no result) /HPF (ref 0–2)
SODIUM SERPL-SCNC: 135 MMOL/L (ref 136–145)
UROBILINOGEN UR-MCNC: 1 MG/DL
WBC # BLD AUTO: 5 X10^3/UL (ref 4–11)
WBC #/AREA URNS HPF: (no result) /HPF (ref 0–4)

## 2021-08-25 PROCEDURE — 96374 THER/PROPH/DIAG INJ IV PUSH: CPT

## 2021-08-25 PROCEDURE — 71045 X-RAY EXAM CHEST 1 VIEW: CPT

## 2021-08-25 PROCEDURE — 83880 ASSAY OF NATRIURETIC PEPTIDE: CPT

## 2021-08-25 PROCEDURE — 93005 ELECTROCARDIOGRAM TRACING: CPT

## 2021-08-25 PROCEDURE — U0003 INFECTIOUS AGENT DETECTION BY NUCLEIC ACID (DNA OR RNA); SEVERE ACUTE RESPIRATORY SYNDROME CORONAVIRUS 2 (SARS-COV-2) (CORONAVIRUS DISEASE [COVID-19]), AMPLIFIED PROBE TECHNIQUE, MAKING USE OF HIGH THROUGHPUT TECHNOLOGIES AS DESCRIBED BY CMS-2020-01-R: HCPCS

## 2021-08-25 PROCEDURE — 96361 HYDRATE IV INFUSION ADD-ON: CPT

## 2021-08-25 PROCEDURE — 96375 TX/PRO/DX INJ NEW DRUG ADDON: CPT

## 2021-08-25 PROCEDURE — 99285 EMERGENCY DEPT VISIT HI MDM: CPT

## 2021-08-25 PROCEDURE — 71275 CT ANGIOGRAPHY CHEST: CPT

## 2021-08-25 PROCEDURE — 84484 ASSAY OF TROPONIN QUANT: CPT

## 2021-08-25 PROCEDURE — 81001 URINALYSIS AUTO W/SCOPE: CPT

## 2021-08-25 PROCEDURE — 36415 COLL VENOUS BLD VENIPUNCTURE: CPT

## 2021-08-25 PROCEDURE — 94640 AIRWAY INHALATION TREATMENT: CPT

## 2021-08-25 PROCEDURE — 80053 COMPREHEN METABOLIC PANEL: CPT

## 2021-08-25 PROCEDURE — 85025 COMPLETE CBC W/AUTO DIFF WBC: CPT

## 2021-08-25 PROCEDURE — 87086 URINE CULTURE/COLONY COUNT: CPT

## 2021-08-25 NOTE — PHYS DOC
Past Medical History


Past Medical History:  Hypertension, Other


Additional Past Medical Histor:  auditory hallucinations


Past Surgical History:  Hysterectomy


Smoking Status:  Never Smoker


Alcohol Use:  None


Drug Use:  None





General Adult


EDM:


Chief Complaint:  FLU SYMPTOM





HPI:


HPI:





Patient is a 71  year old female who presents with lives in [facility] and was brought here by her Cailin  after she was having

weakness, shortness of air, cough and chills that started last night.  She 

states she is just did not feel like taking her medications this morning either.

 Patient denies any pain, nausea, vomiting, diarrhea, chest pain, numbness or 

tingling, syncope, abdominal pain, focal weakness, headache, dizziness, vision 

change.  She has a history of hypertension, schizophrenia, hysterectomy and she 

did get her Covid vaccines.





Review of Systems:


Review of Systems:


Constitutional:   + fever or chills. []


Eyes:   Denies change in visual acuity. []


HENT:   Denies nasal congestion or sore throat. [] 


Respiratory:   + cough or +shortness of breath. [] 


Cardiovascular:   Denies chest pain or edema. [] 


GI:   Denies abdominal pain, nausea, vomiting, bloody stools or diarrhea. [] 


:  Denies dysuria. [] 


Musculoskeletal:   Denies back pain or joint pain. + Generalized weakness [] 


Integument:   Denies rash. [] 


Neurologic:   Denies headache, focal weakness or sensory changes. [] 


Endocrine:   Denies polyuria or polydipsia. [] 


Lymphatic:  Denies swollen glands. [] 


Psychiatric:  Denies depression or anxiety. []





Heart Score:


C/O Chest Pain:  No


Risk Factors:


Risk Factors:  DM, Current or recent (<one month) smoker, HTN, HLP, family 

history of CAD, obesity.


Risk Scores:


Score 0 - 3:  2.5% MACE over next 6 weeks - Discharge Home


Score 4 - 6:  20.3% MACE over next 6 weeks - Admit for Clinical Observation


Score 7 - 10:  72.7% MACE over next 6 weeks - Early Invasive Strategies





Allergies:


Allergies:





Allergies








Coded Allergies Type Severity Reaction Last Updated Verified


 


  No Known Drug Allergies    21 No











Physical Exam:


PE:





Constitutional: Well developed, well nourished, no acute distress, non-toxic 

appearance. []


HENT: Normocephalic, atraumatic, bilateral external ears normal, oropharynx 

moist, no oral exudates, nose normal. []


Eyes: PERRLA, EOMI, conjunctiva normal, no discharge. [] 


Neck: Normal range of motion, no tenderness, supple, no stridor. [] 


Cardiovascular:Heart rate regular rhythm, no murmur []


Lungs & Thorax:  Bilateral upper breath sounds clear and diminished in lower to 

auscultation []


Abdomen: Bowel sounds normal, soft, no tenderness, no masses, no pulsatile 

masses. [] 


Skin: Warm, dry, no erythema, no rash. [] 


Back: No tenderness, no CVA tenderness. [] 


Extremities: No tenderness, no cyanosis, no clubbing, ROM intact, no edema. [] 


Neurologic: Alert and oriented X 3, normal motor function, normal sensory 

function, no focal deficits noted. []


Psychologic: Affect normal, judgement normal, mood normal. []





Current Patient Data:


Vital Signs:





                                   Vital Signs








  Date Time  Temp Pulse Resp B/P (MAP) Pulse Ox O2 Delivery O2 Flow Rate FiO2


 


21 11:26 98.1 82 28 167/97 (98) 96 Room Air  





 98.1       











EKG:


EK and read by Dr. Escoto is sinus rhythm with some ST elevation in V3.  No 

STEMI.  []





Radiology/Procedures:


Radiology/Procedures:


[]


Impression:


                            [facility]


                    [address]  [place], [place] [id number] [phone]


                                        


                                 IMAGING REPORT





                                     Signed





PATIENT: LIV BRICENO JACCOUNT: [id number]     MRN#: [id number]


: 1950           LOCATION: ER              AGE: 71


SEX: F                    EXAM DT: 21         ACCESSION#: [id number]


STATUS: PRE ER            ORD. PHYSICIAN: CHEPE PIMENTEL


REASON: SOA


PROCEDURE: PORTABLE CHEST 1V





EXAM: Chest, single view.





HISTORY: Shortness of air.





COMPARISON: None.





FINDINGS: A frontal view of the chest is obtained. There is no infiltrate, 

pleural effusion or pneumothorax. The heart is normal in size.





IMPRESSION: No acute pulmonary finding.





Electronically signed by: Lina Summers MD (2021 12:19 PM) [id number]














DICTATED and SIGNED BY:     LINA SUMMERS MD


DATE:     21 [id number] 0





                            [facility]


                    [address]  [place], [place] [id number] [phone]


                                        


                                 IMAGING REPORT





                                     Signed





PATIENT: LIV BRICENOUNT: [id number]     MRN#: [id number]


: 1950           LOCATION: ER              AGE: 71


SEX: F                    EXAM DT: 21         ACCESSION#: [id number]


STATUS: PRE ER            ORD. PHYSICIAN: CHEPE PIMENTEL


REASON: soa


PROCEDURE: CT ANGIOGRAPHY CHEST





Examination: CT angiography chest with IV contrast





HISTORY: History of shortness of breath





COMPARISON: 2021





TECHNIQUE: Axial CT angiographic images of chest were performed with IV 

contrast. Coronal and sagittal 3-D MIP reformats are performed





Exposure: One or more of the following individualized dose reduction techniques 

were utilized for this examination:  1. Automated exposure control  2. 

Adjustment of the mA and/or kV according to patient size  3. Use of iterative 

reconstruction technique





FINDINGS:





Unchanged nodule right lobe of the thyroid gland measuring 2.9 cm similar to 

prior exam. The central airways are patent. The ascending aorta measures 3.5 cm 

in transverse dimension. Coronary artery calcifications identified. There is no 

evidence of filling defect identified in the main pulmonary arterial trunk and 

right and left main pulmonary arteries and the visualized lobar, segmental bra

nches of the pulmonary arteries. Minimal atelectasis right lung base. The liver,

 spleen, adrenals grossly appears unremarkable. Mild degenerative changes 

thoracic spine. 





Impression:





1. No evidence of pulmonary embolism.


2. Minimal atelectasis right lung base.








Electronically signed by: Woo Roberts MD (2021 1:47 PM) [id number]














DICTATED and SIGNED BY:     WOO ROBERTS MD


DATE:     21 [id number] 0





Course & Med Decision Making:


Course & Med Decision Making


Pertinent Labs and Imaging studies reviewed. (See chart for details)





COVID-19 CRITERIA:    The patient was evaluated during the global COVID-19 

pandemic, and that diagnosis was suspected/considered upon their initial 

presentation.  Their evaluation, treatment and testing was consistent with 

current guidelines for patients who present with complaints or symptoms that may

 be related to COVID-19.





Alert and oriented x4.  Ambulatory steady gait.  Speaks in full clear sentences.

  PERRLA.  Lungs are clear in upper lobes and diminished in lower lobes.  No 

extremity edema seen.  Afebrile this time.








CTA of chest and chest x-ray are clear.  Vital signs remained stable.  Patient 

is very anxious.  Patient is given dexamethasone and a breathing treatment.  She

 has received 1500 mL normal saline bolus.  Patient Covid is negative.  Patient 

to follow-up with her primary care provider.  She is stable.  I will give her 

Rocephin in the ED for the slight urinary tract infection.  I will put her on 

antibiotic.














[]





Dragon Disclaimer:


Dragon Disclaimer:


This electronic medical record was generated, in whole or in part, using a voice

 recognition dictation system.





COVID-19 Patient Risks:


Age 65 or older:  Yes


Sign of co-morbidity:  Yes


Exp to person + for COVID:  No


Exp to PUI:  No


Travel from affected area:  No


Lower respiratory symptoms:  Yes


Fever:  Yes


Other:  Yes (weakness)





PPE Use:


Full PPE with N95 mask or PAPR:  Yes





Departure


Departure


Impression:  


   Primary Impression:  


   UTI (urinary tract infection)


   Qualified Codes:  N39.0 - Urinary tract infection, site not specified


   Additional Impressions:  


   Generalized weakness


   Shortness of breath


Disposition:  01 HOME / SELF CARE / HOMELESS


Condition:  STABLE


Referrals:  


CATALINO LIZARRAGA (PCP)


Patient Instructions:  Urinary Tract Infection





Additional Instructions:  


Take medication as prescribed every day.  Drink plenty of fluids.  If you begin 

having worsening of symptoms he can return to emergency room.


Scripts


Cephalexin (KEFLEX) 500 Mg Capsule


1 CAP PO TID for 7 Days, #21 CAP


   Prov: CHEPE PIMENTEL         21











CHEPE PIMENTEL            Aug 25, 2021 11:42

## 2021-08-25 NOTE — RAD
EXAM: Chest, single view.



HISTORY: Shortness of air.



COMPARISON: None.



FINDINGS: A frontal view of the chest is obtained. There is no infiltrate, pleural effusion or pneumo
thorax. The heart is normal in size.



IMPRESSION: No acute pulmonary finding.



Electronically signed by: Lina Ty MD (8/25/2021 12:19 PM) [id number]

## 2021-08-25 NOTE — RAD
Examination: CT angiography chest with IV contrast



HISTORY: History of shortness of breath



COMPARISON: 1/25/2021



TECHNIQUE: Axial CT angiographic images of chest were performed with IV contrast. Coronal and sagitta
l 3-D MIP reformats are performed



Exposure: One or more of the following individualized dose reduction techniques were utilized for thi
s examination:  1. Automated exposure control  2. Adjustment of the mA and/or kV according to patient
 size  3. Use of iterative reconstruction technique



FINDINGS:



Unchanged nodule right lobe of the thyroid gland measuring 2.9 cm similar to prior exam. The central 
airways are patent. The ascending aorta measures 3.5 cm in transverse dimension. Coronary artery calc
ifications identified. There is no evidence of filling defect identified in the main pulmonary arteri
al trunk and right and left main pulmonary arteries and the visualized lobar, segmental branches of t
he pulmonary arteries. Minimal atelectasis right lung base. The liver, spleen, adrenals grossly appea
rs unremarkable. Mild degenerative changes thoracic spine. 



Impression:



1. No evidence of pulmonary embolism.

2. Minimal atelectasis right lung base.





Electronically signed by: Woo Roberts MD (8/25/2021 1:47 PM) [id number]

## 2021-08-25 NOTE — EKG
[facility]

              [address] [place] [address]

Test Date:    2021               Test Time:    12:04:40

Pat Name:     LIV BRICENO       Department:   

Patient ID:   [id number]           Room:          

Gender:       F                        Technician:   

:          1950               Requested By: CHEPE PIMENTEL

Order Number: [id number]           Reading MD:     

                                 Measurements

Intervals                              Axis          

Rate:         70                       P:            41

DC:           214                      QRS:          -31

QRSD:         80                       T:            74

QT:           394                                    

QTc:          428                                    

                           Interpretive Statements

SINUS RHYTHM

ABNORMAL LEFT AXIS DEVIATION

LEFT ANTERIOR FASCICULAR BLOCK

QRS(T) CONTOUR ABNORMALITY

CONSISTENT WITH ANTEROSEPTAL INFARCT

PROBABLY OLD

ABNORMAL ECG

RI6.01

No previous ECG available for comparison

## 2021-10-13 ENCOUNTER — HOSPITAL ENCOUNTER (OUTPATIENT)
Dept: HOSPITAL 61 - KCIC CT | Age: 71
End: 2021-10-13
Attending: NURSE PRACTITIONER
Payer: MEDICARE

## 2021-10-13 DIAGNOSIS — K43.9: Primary | ICD-10-CM

## 2021-10-13 DIAGNOSIS — J84.10: ICD-10-CM

## 2021-10-13 DIAGNOSIS — K46.9: ICD-10-CM

## 2021-10-13 DIAGNOSIS — N28.1: ICD-10-CM

## 2021-10-13 DIAGNOSIS — K57.30: ICD-10-CM

## 2021-10-13 DIAGNOSIS — J98.11: ICD-10-CM

## 2021-10-13 PROCEDURE — 82565 ASSAY OF CREATININE: CPT

## 2021-10-13 PROCEDURE — 74177 CT ABD & PELVIS W/CONTRAST: CPT

## 2021-10-13 NOTE — KCIC
EXAM: CT ABDOMEN/PELVIS WITH CONTRAST.



HISTORY: Right lower quadrant pain and hernia.



TECHNIQUE: Computed tomography of the abdomen and pelvis was performed after the intravenous administ
ration of iodinated contrast. One or more of the following individualized dose reduction techniques w
ere utilized for this examination:  

1. Automated exposure control.  

2. Adjustment of the mA and/or kV according to patient size.  

3. Use of iterative reconstruction technique.



COMPARISON: None.



FINDINGS: Lung windows through the visualized portions of the bases reveal mild atelectasis. There is
 a calcified granuloma in the right lower lobe. Bone windows reveal no suspicious lesions. There is g
rade 1 anterolisthesis at L4-5.



A benign cyst in the right kidney measures 1.5 cm. The liver, gallbladder, pancreas, adrenal glands a
nd spleen are unremarkable. There are no pathologically enlarged lymph nodes.



The appendix is not inflamed. The uterus is surgically absent. Sigmoid diverticulosis is mild. There 
is no small bowel obstruction.



A small Spigelian hernia on the right contains only fat. The neck measures 1.6 cm. 



IMPRESSION: 

1. Small right spigelian hernia containing only fat.



Electronically signed by: NITO Vu MD (10/13/2021 3:25 PM) [id number]

## 2022-05-12 ENCOUNTER — HOSPITAL ENCOUNTER (OUTPATIENT)
Dept: HOSPITAL 61 - ER | Age: 72
Setting detail: OBSERVATION
LOS: 1 days | Discharge: HOME | End: 2022-05-13
Attending: INTERNAL MEDICINE | Admitting: INTERNAL MEDICINE
Payer: MEDICARE

## 2022-05-12 VITALS — SYSTOLIC BLOOD PRESSURE: 144 MMHG | DIASTOLIC BLOOD PRESSURE: 67 MMHG

## 2022-05-12 VITALS — DIASTOLIC BLOOD PRESSURE: 103 MMHG | SYSTOLIC BLOOD PRESSURE: 154 MMHG

## 2022-05-12 VITALS — SYSTOLIC BLOOD PRESSURE: 100 MMHG | DIASTOLIC BLOOD PRESSURE: 60 MMHG

## 2022-05-12 VITALS — DIASTOLIC BLOOD PRESSURE: 82 MMHG | SYSTOLIC BLOOD PRESSURE: 156 MMHG

## 2022-05-12 VITALS — DIASTOLIC BLOOD PRESSURE: 59 MMHG | SYSTOLIC BLOOD PRESSURE: 109 MMHG

## 2022-05-12 VITALS — WEIGHT: 169.98 LBS | HEIGHT: 65 IN | BODY MASS INDEX: 28.32 KG/M2

## 2022-05-12 VITALS — SYSTOLIC BLOOD PRESSURE: 130 MMHG | DIASTOLIC BLOOD PRESSURE: 75 MMHG

## 2022-05-12 DIAGNOSIS — I44.2: ICD-10-CM

## 2022-05-12 DIAGNOSIS — E87.6: ICD-10-CM

## 2022-05-12 DIAGNOSIS — R11.2: ICD-10-CM

## 2022-05-12 DIAGNOSIS — E78.5: ICD-10-CM

## 2022-05-12 DIAGNOSIS — R07.89: Primary | ICD-10-CM

## 2022-05-12 DIAGNOSIS — Z98.890: ICD-10-CM

## 2022-05-12 DIAGNOSIS — K21.9: ICD-10-CM

## 2022-05-12 DIAGNOSIS — Z79.899: ICD-10-CM

## 2022-05-12 DIAGNOSIS — I25.10: ICD-10-CM

## 2022-05-12 DIAGNOSIS — Z79.82: ICD-10-CM

## 2022-05-12 DIAGNOSIS — N28.9: ICD-10-CM

## 2022-05-12 DIAGNOSIS — I10: ICD-10-CM

## 2022-05-12 DIAGNOSIS — G24.9: ICD-10-CM

## 2022-05-12 DIAGNOSIS — F20.9: ICD-10-CM

## 2022-05-12 DIAGNOSIS — M85.80: ICD-10-CM

## 2022-05-12 DIAGNOSIS — F31.9: ICD-10-CM

## 2022-05-12 DIAGNOSIS — Z90.710: ICD-10-CM

## 2022-05-12 DIAGNOSIS — R53.1: ICD-10-CM

## 2022-05-12 DIAGNOSIS — R06.02: ICD-10-CM

## 2022-05-12 LAB
ALBUMIN SERPL-MCNC: 3.2 G/DL (ref 3.4–5)
ALBUMIN/GLOB SERPL: 0.8 {RATIO} (ref 1–1.7)
ALP SERPL-CCNC: 113 U/L (ref 46–116)
ALT SERPL-CCNC: 32 U/L (ref 14–59)
ANION GAP SERPL CALC-SCNC: 9 MMOL/L (ref 6–14)
APTT BLD: 29 SEC (ref 24–38)
AST SERPL-CCNC: 30 U/L (ref 15–37)
BACTERIA #/AREA URNS HPF: 0 /HPF
BASOPHILS # BLD AUTO: 0.1 X10^3/UL (ref 0–0.2)
BASOPHILS NFR BLD: 1 % (ref 0–3)
BILIRUB SERPL-MCNC: 0.3 MG/DL (ref 0.2–1)
BUN SERPL-MCNC: 8 MG/DL (ref 7–20)
BUN/CREAT SERPL: 7 (ref 6–20)
CALCIUM SERPL-MCNC: 8.2 MG/DL (ref 8.5–10.1)
CHLORIDE SERPL-SCNC: 107 MMOL/L (ref 98–107)
CHOLEST SERPL-MCNC: 150 MG/DL (ref 0–200)
CHOLEST/HDLC SERPL: 3.2 {RATIO}
CO2 SERPL-SCNC: 28 MMOL/L (ref 21–32)
CREAT SERPL-MCNC: 1.2 MG/DL (ref 0.6–1)
D DIMER PPP FEU-MCNC: 1.39 UG/MLFEU (ref 0–0.5)
EOSINOPHIL NFR BLD: 0.2 X10^3/UL (ref 0–0.7)
EOSINOPHIL NFR BLD: 4 % (ref 0–3)
ERYTHROCYTE [DISTWIDTH] IN BLOOD BY AUTOMATED COUNT: 14.6 % (ref 11.5–14.5)
GFR SERPLBLD BASED ON 1.73 SQ M-ARVRAT: 53.4 ML/MIN
GLUCOSE SERPL-MCNC: 104 MG/DL (ref 70–99)
HCT VFR BLD CALC: 29.8 % (ref 36–47)
HDLC SERPL-MCNC: 47 MG/DL (ref 40–60)
HGB BLD-MCNC: 10.2 G/DL (ref 12–15.5)
LDLC: 89 MG/DL (ref 0–100)
LIPASE: 284 U/L (ref 73–393)
LYMPHOCYTES # BLD: 2.5 X10^3/UL (ref 1–4.8)
LYMPHOCYTES NFR BLD AUTO: 49 % (ref 24–48)
MCH RBC QN AUTO: 32 PG (ref 25–35)
MCHC RBC AUTO-ENTMCNC: 34 G/DL (ref 31–37)
MCV RBC AUTO: 93 FL (ref 79–100)
MONO #: 0.7 X10^3/UL (ref 0–1.1)
MONOCYTES NFR BLD: 14 % (ref 0–9)
NEUT #: 1.6 X10^3/UL (ref 1.8–7.7)
NEUTROPHILS NFR BLD AUTO: 32 % (ref 31–73)
PLATELET # BLD AUTO: 237 X10^3/UL (ref 140–400)
POTASSIUM SERPL-SCNC: 2.8 MMOL/L (ref 3.5–5.1)
PROT SERPL-MCNC: 7.2 G/DL (ref 6.4–8.2)
PROTHROMBIN TIME: 12.6 SEC (ref 11.7–14)
RBC # BLD AUTO: 3.19 X10^6/UL (ref 3.5–5.4)
RBC #/AREA URNS HPF: 0 /HPF (ref 0–2)
SODIUM SERPL-SCNC: 144 MMOL/L (ref 136–145)
TRIGL SERPL-MCNC: 69 MG/DL (ref 0–150)
VLDLC: 14 MG/DL (ref 0–40)
WBC # BLD AUTO: 5 X10^3/UL (ref 4–11)
WBC #/AREA URNS HPF: 0 /HPF (ref 0–4)

## 2022-05-12 PROCEDURE — 85610 PROTHROMBIN TIME: CPT

## 2022-05-12 PROCEDURE — 71275 CT ANGIOGRAPHY CHEST: CPT

## 2022-05-12 PROCEDURE — 71045 X-RAY EXAM CHEST 1 VIEW: CPT

## 2022-05-12 PROCEDURE — 96375 TX/PRO/DX INJ NEW DRUG ADDON: CPT

## 2022-05-12 PROCEDURE — G0379 DIRECT REFER HOSPITAL OBSERV: HCPCS

## 2022-05-12 PROCEDURE — 97161 PT EVAL LOW COMPLEX 20 MIN: CPT

## 2022-05-12 PROCEDURE — 97535 SELF CARE MNGMENT TRAINING: CPT

## 2022-05-12 PROCEDURE — 83690 ASSAY OF LIPASE: CPT

## 2022-05-12 PROCEDURE — C8929 TTE W OR WO FOL WCON,DOPPLER: HCPCS

## 2022-05-12 PROCEDURE — 84443 ASSAY THYROID STIM HORMONE: CPT

## 2022-05-12 PROCEDURE — 96372 THER/PROPH/DIAG INJ SC/IM: CPT

## 2022-05-12 PROCEDURE — 84484 ASSAY OF TROPONIN QUANT: CPT

## 2022-05-12 PROCEDURE — 96376 TX/PRO/DX INJ SAME DRUG ADON: CPT

## 2022-05-12 PROCEDURE — 83735 ASSAY OF MAGNESIUM: CPT

## 2022-05-12 PROCEDURE — 99285 EMERGENCY DEPT VISIT HI MDM: CPT

## 2022-05-12 PROCEDURE — 97165 OT EVAL LOW COMPLEX 30 MIN: CPT

## 2022-05-12 PROCEDURE — 93005 ELECTROCARDIOGRAM TRACING: CPT

## 2022-05-12 PROCEDURE — 96361 HYDRATE IV INFUSION ADD-ON: CPT

## 2022-05-12 PROCEDURE — 96365 THER/PROPH/DIAG IV INF INIT: CPT

## 2022-05-12 PROCEDURE — 80053 COMPREHEN METABOLIC PANEL: CPT

## 2022-05-12 PROCEDURE — 85025 COMPLETE CBC W/AUTO DIFF WBC: CPT

## 2022-05-12 PROCEDURE — 85730 THROMBOPLASTIN TIME PARTIAL: CPT

## 2022-05-12 PROCEDURE — 96366 THER/PROPH/DIAG IV INF ADDON: CPT

## 2022-05-12 PROCEDURE — 81001 URINALYSIS AUTO W/SCOPE: CPT

## 2022-05-12 PROCEDURE — G0378 HOSPITAL OBSERVATION PER HR: HCPCS

## 2022-05-12 PROCEDURE — 83880 ASSAY OF NATRIURETIC PEPTIDE: CPT

## 2022-05-12 PROCEDURE — 85379 FIBRIN DEGRADATION QUANT: CPT

## 2022-05-12 PROCEDURE — 36415 COLL VENOUS BLD VENIPUNCTURE: CPT

## 2022-05-12 PROCEDURE — 93306 TTE W/DOPPLER COMPLETE: CPT

## 2022-05-12 PROCEDURE — 80061 LIPID PANEL: CPT

## 2022-05-12 PROCEDURE — 97116 GAIT TRAINING THERAPY: CPT

## 2022-05-12 PROCEDURE — 80048 BASIC METABOLIC PNL TOTAL CA: CPT

## 2022-05-12 RX ADMIN — POTASSIUM CHLORIDE SCH MLS/HR: 7.46 INJECTION, SOLUTION INTRAVENOUS at 07:21

## 2022-05-12 RX ADMIN — ASPIRIN SCH MG: 325 TABLET, DELAYED RELEASE ORAL at 07:28

## 2022-05-12 RX ADMIN — MORPHINE SULFATE PRN MG: 2 INJECTION, SOLUTION INTRAMUSCULAR; INTRAVENOUS at 05:50

## 2022-05-12 RX ADMIN — MORPHINE SULFATE PRN MG: 2 INJECTION, SOLUTION INTRAMUSCULAR; INTRAVENOUS at 14:15

## 2022-05-12 RX ADMIN — HEPARIN SODIUM SCH UNIT: 5000 INJECTION, SOLUTION INTRAVENOUS; SUBCUTANEOUS at 20:39

## 2022-05-12 RX ADMIN — HEPARIN SODIUM SCH UNIT: 5000 INJECTION, SOLUTION INTRAVENOUS; SUBCUTANEOUS at 14:05

## 2022-05-12 RX ADMIN — POTASSIUM CHLORIDE SCH MLS/HR: 7.46 INJECTION, SOLUTION INTRAVENOUS at 08:17

## 2022-05-12 RX ADMIN — HEPARIN SODIUM SCH UNIT: 5000 INJECTION, SOLUTION INTRAVENOUS; SUBCUTANEOUS at 07:33

## 2022-05-12 NOTE — CARD
MR#: [id number]

Account#: [id number]

Accession#: [id number]

Date of Study: 05/12/2022

Ordering Physician: NENITA CHRISTINA, 

Referring Physician: NENITA CHRISTINA 

Tech: [facility]





--------------- APPROVED REPORT --------------





EXAM: Two-dimensional and M-mode echocardiogram with Doppler and color Doppler.



Other Information 

Quality : AverageHR: 60bpm

Rhythm : NSR



INDICATION

Chest Pain 



RISK FACTORS

Hypertension 

Hyperlipidemia



2D DIMENSIONS 

Left Atrium(2D)4.2 (1.6-4.0cm)IVSd1.2 (0.7-1.1cm)

Aortic Root(2D)3.1 (2.0-3.7cm)LVDd4.6 (3.9-5.9cm)

LVOT Diameter2.0 (1.8-2.4cm)PWd1.2 (0.7-1.1cm)

LA Hckpam91 (18-58mL)LVDs3.0 (2.5-4.0cm)

FS (%) 34.2 %SV61.1 ml

LVEF(%)63.2 (>50%)



Aortic Valve

AoV Peak Jung.131.8cm/sAoV VTI26.9cm

AO Peak GR.7.0mmHgLVOT Peak Jung.94.8cm/s

AO Mean GR.4mmHgAVA (VMAX)2.25cm2



Mitral Valve

MV E Iwbezcfh705.6cm/sMV DECEL DOYN636dp

MV A Rfcoshbv70.8cm/sE/A  Ratio1.3



Pulmonary Valve

PV Peak Obkpkmbk65.7cm/s



Tricuspid Valve

TR P. Mniqsxjb805mq/sTR Peak Gr.34mmHg



Pulmonary Vein

S1 Pxltuqoj40.9cm/sD2 Mufsuvew93.0cm/s



 LEFT VENTRICLE 

The left ventricle is normal size. There is mild concentric left ventricular hypertrophy. The left ve
ntricular systolic function is normal and the ejection fraction is within normal range. EF 55% There 
is normal LV segmental wall motion. Transmitral Doppler flow pattern is Grade II-pseudonormal filling
 dynamics. No left ventricle thrombus noted on this study. There is no ventricular septal defect visu
alized. There is no left ventricular aneurysm. There is no mass noted in the left ventricle.



 RIGHT VENTRICLE 

The right ventricle is normal size. There is normal right ventricular wall thickness. The right ventr
icular systolic function is normal.



 ATRIA 

The left atrium is moderately dilated. The right atrium size is normal. The interatrial septum is int
act with no evidence for an atrial septal defect or patent foramen ovale as noted on 2-D or Doppler i
maging.



 AORTIC VALVE 

The aortic valve is normal in structure and function. Doppler and Color Flow revealed no significant 
aortic regurgitation. There is no significant aortic valvular stenosis. There is no aortic valvular v
egetation.



 MITRAL VALVE 

The mitral valve is thickened but opens well. There is no evidence of mitral valve prolapse. There is
 no mitral valve stenosis. Doppler and Color-flow revealed mild to moderate mitral regurgitation.



 TRICUSPID VALVE 

The tricuspid valve is normal in structure and function. Doppler and Color Flow revealed trace to mil
d tricuspid regurgitation. There is no tricuspid valve prolapse or vegetation. There is no tricuspid 
valve stenosis.



 PULMONIC VALVE 

Doppler and Color Flow revealed mild pulmonic valvular regurgitation. There is no pulmonic valvular s
tenosis.



 GREAT VESSELS 

The aortic root is normal in size. The ascending aorta is normal in size. The IVC is normal in size a
nd collapses >50% with inspiration.



 PERICARDIAL EFFUSION 

There is no pleural effusion. There is no evidence of significant pericardial effusion.



Critical Notification

Critical Value: No



<Conclusion>

The left ventricular systolic function is normal and the ejection fraction is within normal range. EF
 55%

There is normal LV segmental wall motion.

Doppler and Color-flow revealed moderate mitral regurgitation.



Signed by : Reggie Reed, 

Electronically Approved : 05/12/2022 15:13:24

## 2022-05-12 NOTE — RAD
PQRS Compliance Statement:



One or more of the following individualized dose reduction techniques were utilized for this examinat
ion:  

1. Automated exposure control  

2. Adjustment of the mA and/or kV according to patient size  

3. Use of iterative reconstruction technique





CT CHEST WITH CONTRAST, PULMONARY ANGIOGRAM



History: Reason: CP, SOA, elevated dimer;OMNI 350,90ML / Spl. Instructions:  / History:  



Comparison: CTPA August 25, 2021.



Technique:  Helical CT of the chest was performed after the administration of 90 cc of Omnipaque 350 
intravenous contrast according to PE protocol.   Axial and coronal reconstructions were obtained. 3-D
 MIP images were constructed to better evaluate the pulmonary arteries.



Findings: 

Pulmonary arteries are adequately opacified. There is no evidence of pulmonary embolism.



There is no thoracic aortic dissection. There is coronary artery disease. The cardiac size is normal,
 no pericardial effusion. There are calcified right hilar lymph nodes. There is no adenopathy in the 
chest.



There is no pleural abnormality. The central airways are patent. There is minimal atelectasis in the 
posterior right lower lobe. Stable tiny perifissural lymph nodes along the left major fissure.



The visualized upper abdomen is unremarkable.

There is no acute bone abnormality. The thoracic spine alignment is maintained.



IMPRESSION:

There is no pulmonary embolus.



Electronically signed by: Alonzo Garcia MD (5/12/2022 4:13 AM) [facility]

## 2022-05-12 NOTE — NUR
The patient, LIV BRICENO, 71 y/o, F admitted by OANH RECINOS MD, was given written 
information regarding hospital policies, unit procedures and contact persons.  Valuables 
were checked and documented. pt is a/ox3, answered questions appropriately. pt potassium 2.8 
40meq potassium given pt c/o chest pain will medicate. call light in reach, will cont to 
monitor pt status and safety.pmrn

## 2022-05-12 NOTE — RAD
XR CHEST 1V



Clinical Indication: Reason: CP / Spl. Instructions:  / History: 



Comparison:  AP chest August 25, 2021.



Findings: 

The cardiomediastinal silhouette is normal. Lungs are clear. There is no pneumothorax. No pleural eff
usion is appreciated. No acute bone abnormality. There is arthropathy of the bilateral shoulders.



IMPRESSION: 

No acute cardiopulmonary process.





Electronically signed by: Alonzo Garcia MD (5/12/2022 4:45 AM) [facility]

## 2022-05-12 NOTE — PDOC1
History and Physical


Date of Admission


Date of Admission


DATE: 5/12/22 


TIME: 07:02





Identification/Chief Complaint


Chief Complaint


Chest pain





Source


Source:  Patient





History of Present Illness


History of Present Illness


Ms Lutz is a 71yo female with PMHx HTN and schizophrenia who comes to ED 

via EMS c/o chest pain that woke her up out of sleep.


Chest pain and cough has been going past day prior to arrival the pain gets 

worse.  Woke her from sleep.  She notes her cough is nonproductive


Labs with WBC 5, Hb 10.2, platelets 237, PT 12.6, INR 1, PTT 29, D-dimer 1.39, 

, K2.8, BUN 8, CR 1.2, glucose 104, calcium 8.2, bilirubin 0.3, AST 30, 

ALT 32, alkaline phosphatase 113, albumin 3.2, lipase 284, TSH 2.033, NT proBNP 

116, initial troponin was 27 and repeat is 26.  Urinalysis bland.  Chest 

radiograph with no acute abnormalities.  Due to elevated D-dimer she underwent 

CTPA which was negative for pulmonary embolism.


EKG my interpretation sinus rhythm rate of 64 bpm with leftward axis MA interval

212  first degree heart block, no ST segment elevations no T WI.


After initial evaluation potassium replacement.  Chest pain has improved.  She 

notes she does not know all her medications that she follows with [facility] and gets all her medications sent to her and blister 

packs.  She does note she had a telemedicine visit and had a medication change 

recently one of them made her vomit she does not know which.





Past Medical History


Cardiovascular:  HTN


Psych:  Schizophrenia





Past Surgical History


Past Surgical History:  No pertinent history





Family History


Family History:  Depression





Social History


Smoke:  No


ALCOHOL:  none


Drugs:  None





Current Problem List


Problem List


Problems


Medical Problems:


(1) Chest pain at rest


Status: Acute  





(2) Nausea & vomiting


Status: Acute  











Current Medications


Current Medications





Current Medications


Aspirin (Aspirin Chewable) 324 mg 1X  ONCE PO  Last administered on 5/12/22at 

02:15;  Start 5/12/22 at 02:15;  Stop 5/12/22 at 02:16;  Status DC


Fentanyl Citrate (Fentanyl 2ml Vial) 50 mcg 1X  ONCE IVP  Last administered on 

5/12/22at 02:15;  Start 5/12/22 at 02:15;  Stop 5/12/22 at 02:16;  Status DC


Ondansetron HCl (Zofran) 4 mg 1X  ONCE IVP  Last administered on 5/12/22at 

02:15;  Start 5/12/22 at 02:15;  Stop 5/12/22 at 02:16;  Status DC


Sodium Chloride 1,000 ml @  1,000 mls/hr 1X  ONCE IV  Last administered on 5/ 12/22at 02:15;  Start 5/12/22 at 02:15;  Stop 5/12/22 at 03:14;  Status DC


Ondansetron HCl (Zofran) 4 mg PRN Q8HRS  PRN IVP NAUSEA/VOMITING;  Start 5/12/22

at 02:15;  Stop 5/13/22 at 02:14


Morphine Sulfate (Morphine Sulfate) 2 mg PRN Q2HR  PRN IVP PAIN Last 

administered on 5/12/22at 05:50;  Start 5/12/22 at 02:15;  Stop 5/13/22 at 02:14


Iohexol (Omnipaque 350 Mg/ml) 90 ml 1X  ONCE IV  Last administered on 5/12/22at 

03:43;  Start 5/12/22 at 03:30;  Stop 5/12/22 at 03:31;  Status DC


Info (CONTRAST GIVEN -- Rx MONITORING) 1 each PRN DAILY  PRN MC SEE COMMENTS;  

Start 5/12/22 at 03:45;  Stop 5/14/22 at 03:44


Potassium Chloride (Klor-Con) 40 meq 1X  ONCE PO  Last administered on 5/12/22at

05:37;  Start 5/12/22 at 04:15;  Stop 5/12/22 at 04:16;  Status DC





Active Scripts


Active


Keflex (Cephalexin) 500 Mg Capsule 1 Cap PO TID 7 Days


Reported


Geodon (Ziprasidone Hcl) 60 Mg Capsule 1 Cap PO DAILY


Tylenol (Acetaminophen) 325 Mg Tablet 1-2 Tab PO PRN Q4-6HRS PRN


Trazodone Hcl 100 Mg Tablet 100 Mg PO HS


Losartan Potassium 100 Mg Tablet 100 Mg PO DAILY





Allergies


Allergies:  


Coded Allergies:  


     No Known Drug Allergies (Unverified , 8/25/21)





ROS


General:  YES: Fatigue, Malaise; 


   No: Chills, Night Sweats, Appetite, Other


PSYCHOLOGICAL ROS:  YES: Memory difficulties, Obsessive thoughts; 


   No: Anxiety, Behavioral Disorder, Concentration difficultie, Decreased 

libido, Depression, Disorientation, Hallucinations, Hostility, Irritablity, Mood

Swings, Physical abuse, Sexual abuse, Sleep disturbances, Suicidal ideation, 

Other


Eyes:  No Blurry vision, No Decreased vision, No Double vision, No Dry eyes, No 

Excessive tearing, No Eye Pain, No Itchy Eyes, No Loss of vision, No 

Photophobia, No Scotomata, No Uses contacts, No Uses glasses, No Other


HEENT:  No: Heacaches, Visual Changes, Hearing change, Nasal congestion, Nasal 

discharge, Oral lesions, Sinus pain, Sore Throat, Epistaxis, Sneezing, Snoring, 

Tinnitus, Vertigo, Vocal changes, Other


ALLERGY AND IMMUNOLOGY:  No: Hives, Insect Bite Sensitivity, Itchy/Watery Eyes, 

Nasal Congestion, Post Nasal Drip, Seasonal Allergies, Other


Hematological and Lymphatic:  No: Bleeding Problems, Blood Clots, Blood 

Transfusions, Brusing, Night Sweats, Pallor, Swollen Lymph Nodes, Other


ENDOCRINE:  No: Breast Changes, Galactorrhea, Hair Pattern Changes, Hot Flashes,

Malaise/lethargy, Mood Swings, Palpitations, Polydipsia/polyuria, Skin Changes, 

Temperature Intolerance, Unexpected Weight Changes, Other


Breast:  No New/Changing Breast Lumps, No Nipple changes, No Nipple discharge, 

No Other


Respiratory:  No: Cough, Hemoptysis, Orthopnea, Pleuritic Pain, Shortness of 

breath, SOB with excertion, Sputum Changes, Stridor, Tachypnea, Wheezing, Other


Cardiovascular:  yes Chest Pain; 


   No Palpitations, No Orthopnea, No Paroxysmal Noc. Dyspnea, No Edema, No Lt 

Headedness, No Other


Gastrointestinal:  Yes Nausea; 


   No Vomiting, No Abdominal Pain, No Diarrhea, No Constipation, No Melena, No 

Hematochezia, No Other


Genitourinary:  No Dysuria, No Frequency, No Incontinence, No Hematuria, No 

Retention, No Discharge, No Urgency, No Pain, No Flank Pain, No Other, No , No ,

No , No , No , No , No 


Musculoskeletal:  No Gait Disturbance, No Joint Pain, No Joint Stiffness, No 

Joint Swelling, No Muscle Pain, No Muscular Weakness, No Pain In:, No Swelling 

In:, No Other


Neurological:  No Behavorial Changes, No Bowel/Bladder ControlChng, No 

Confusion, No Dizziness, No Gait Disturbance, No Headaches, No Impaired 

Coord/balance, No Memory Loss, No Numbness/Tingling, No Seizures, No Speech 

Problems, No Tremors, No Visual Changes, No Weakness, No Other


Skin:  No Dry Skin, No Eczema, No Hair Changes, No Lumps, No Mole Changes, No 

Mottling, No Nail Changes, No Pruritus, No Rash, No Skin Lesion Changes, No 

Other, No Acne





Physical Exam


General:  Alert, Oriented X3, Cooperative, No acute distress


HEENT:  Atraumatic, PERRLA, EOMI, Mucous membr. moist/pink


Lungs:  Clear to auscultation, Normal air movement


Heart:  S1S2, RRR, no thrills, no rubs, no gallops, no murmurs


Abdomen:  Normal bowel sounds, Soft, No hepatosplenomegaly, No masses, Other 

(Epigastric tenderness)


Rectal Exam:  not examined


Extremities:  No clubbing, No cyanosis, No edema, Normal pulses, No 

tenderness/swelling


Skin:  No rashes, No breakdown, No significant lesion


Neuro:  Normal gait, Normal speech, Strength at 5/5 X4 ext, Normal tone, 

Sensation intact, Cranial nerves 3-12 NL, Reflexes 2+


Psych/Mental Status:  Mental status NL, Mood NL, Other (Slow speech somewhat 

circumferential and tangential easy to reorient and redirect)





Vitals


Vitals





Vital Signs








  Date Time  Temp Pulse Resp B/P (MAP) Pulse Ox O2 Delivery O2 Flow Rate FiO2


 


5/12/22 06:56     100   


 


5/12/22 05:50      Room Air  


 


5/12/22 04:06 97.2 74 18 156/82 (106)    





 97.2       











Labs


Labs





Laboratory Tests








Test


 5/12/22


01:58 5/12/22


02:21 5/12/22


02:55 5/12/22


06:30


 


White Blood Count


 5.0 x10^3/uL


(4.0-11.0) 


 


 





 


Red Blood Count


 3.19 x10^6/uL


(3.50-5.40) 


 


 





 


Hemoglobin


 10.2 g/dL


(12.0-15.5) 


 


 





 


Hematocrit


 29.8 %


(36.0-47.0) 


 


 





 


Mean Corpuscular Volume 93 fL ()    


 


Mean Corpuscular Hemoglobin 32 pg (25-35)    


 


Mean Corpuscular Hemoglobin


Concent 34 g/dL


(31-37) 


 


 





 


Red Cell Distribution Width


 14.6 %


(11.5-14.5) 


 


 





 


Platelet Count


 237 x10^3/uL


(140-400) 


 


 





 


Neutrophils (%) (Auto) 32 % (31-73)    


 


Lymphocytes (%) (Auto) 49 % (24-48)    


 


Monocytes (%) (Auto) 14 % (0-9)    


 


Eosinophils (%) (Auto) 4 % (0-3)    


 


Basophils (%) (Auto) 1 % (0-3)    


 


Neutrophils # (Auto)


 1.6 x10^3/uL


(1.8-7.7) 


 


 





 


Lymphocytes # (Auto)


 2.5 x10^3/uL


(1.0-4.8) 


 


 





 


Monocytes # (Auto)


 0.7 x10^3/uL


(0.0-1.1) 


 


 





 


Eosinophils # (Auto)


 0.2 x10^3/uL


(0.0-0.7) 


 


 





 


Basophils # (Auto)


 0.1 x10^3/uL


(0.0-0.2) 


 


 





 


Prothrombin Time


 12.6 SEC


(11.7-14.0) 


 


 





 


Prothromb Time International


Ratio 1.0 (0.8-1.1) 


 


 


 





 


Activated Partial


Thromboplast Time 29 SEC (24-38) 


 


 


 





 


D-Dimer (Karely)


 1.39 ug/mlFEU


(0.00-0.50) 


 


 





 


Urine Collection Type  Unknown   


 


Urine Color (Auto)  Colorless   


 


Urine Turbidity  Clear   


 


Urine pH (Auto)  6.0 (<5.0-8.0)   


 


Urine Specific Gravity


 


 1.003


(1.000-1.030) 


 





 


Urine Protein (Auto)


 


 Negative mg/dL


(Negative) 


 





 


Urine Glucose (Auto)(UA)


 


 Negative mg/dL


(Negative) 


 





 


Urine Ketones (Auto)


 


 Negative mg/dL


(Negative) 


 





 


Urine Blood (Auto)


 


 Negative


(Negative) 


 





 


Urine Nitrite


 


 Negative


(Negative) 


 





 


Urine Bilirubin (Auto)


 


 Negative


(Negative) 


 





 


Urine Urobilinogen (Auto)


 


 Normal mg/dL


(Normal) 


 





 


Urine Leukocyte Esterase


(Auto) 


 Negative


(Negative) 


 





 


Urine RBC  0 /HPF (0-2)   


 


Urine WBC  0 /HPF (0-4)   


 


Urine Squamous Epithelial


Cells 


 Few /LPF 


 


 





 


Urine Bacteria  0 /HPF (0-FEW)   


 


Sodium Level


 


 


 144 mmol/L


(136-145) 





 


Potassium Level


 


 


 2.8 mmol/L


(3.5-5.1) 





 


Chloride Level


 


 


 107 mmol/L


() 





 


Carbon Dioxide Level


 


 


 28 mmol/L


(21-32) 





 


Anion Gap   9 (6-14)  


 


Blood Urea Nitrogen   8 mg/dL (7-20)  


 


Creatinine


 


 


 1.2 mg/dL


(0.6-1.0) 





 


Estimated GFR


(Cockcroft-Gault) 


 


 53.4 


 





 


BUN/Creatinine Ratio   7 (6-20)  


 


Glucose Level


 


 


 104 mg/dL


(70-99) 





 


Calcium Level


 


 


 8.2 mg/dL


(8.5-10.1) 





 


Total Bilirubin


 


 


 0.3 mg/dL


(0.2-1.0) 





 


Aspartate Amino Transf


(AST/SGOT) 


 


 30 U/L (15-37) 


 





 


Alanine Aminotransferase


(ALT/SGPT) 


 


 32 U/L (14-59) 


 





 


Alkaline Phosphatase


 


 


 113 U/L


() 





 


Troponin I High Sensitivity   27 ng/L (4-50)  26 ng/L (4-50) 


 


NT-Pro-B-Type Natriuretic


Peptide 


 


 116 pg/mL


(0-124) 





 


Total Protein


 


 


 7.2 g/dL


(6.4-8.2) 





 


Albumin


 


 


 3.2 g/dL


(3.4-5.0) 





 


Albumin/Globulin Ratio   0.8 (1.0-1.7)  


 


Lipase


 


 


 284 U/L


() 





 


Thyroid Stimulating Hormone


(TSH) 


 


 2.033 uIU/mL


(0.358-3.74) 











Laboratory Tests








Test


 5/12/22


01:58 5/12/22


02:21 5/12/22


02:55 5/12/22


06:30


 


White Blood Count


 5.0 x10^3/uL


(4.0-11.0) 


 


 





 


Red Blood Count


 3.19 x10^6/uL


(3.50-5.40) 


 


 





 


Hemoglobin


 10.2 g/dL


(12.0-15.5) 


 


 





 


Hematocrit


 29.8 %


(36.0-47.0) 


 


 





 


Mean Corpuscular Volume 93 fL ()    


 


Mean Corpuscular Hemoglobin 32 pg (25-35)    


 


Mean Corpuscular Hemoglobin


Concent 34 g/dL


(31-37) 


 


 





 


Red Cell Distribution Width


 14.6 %


(11.5-14.5) 


 


 





 


Platelet Count


 237 x10^3/uL


(140-400) 


 


 





 


Neutrophils (%) (Auto) 32 % (31-73)    


 


Lymphocytes (%) (Auto) 49 % (24-48)    


 


Monocytes (%) (Auto) 14 % (0-9)    


 


Eosinophils (%) (Auto) 4 % (0-3)    


 


Basophils (%) (Auto) 1 % (0-3)    


 


Neutrophils # (Auto)


 1.6 x10^3/uL


(1.8-7.7) 


 


 





 


Lymphocytes # (Auto)


 2.5 x10^3/uL


(1.0-4.8) 


 


 





 


Monocytes # (Auto)


 0.7 x10^3/uL


(0.0-1.1) 


 


 





 


Eosinophils # (Auto)


 0.2 x10^3/uL


(0.0-0.7) 


 


 





 


Basophils # (Auto)


 0.1 x10^3/uL


(0.0-0.2) 


 


 





 


Prothrombin Time


 12.6 SEC


(11.7-14.0) 


 


 





 


Prothromb Time International


Ratio 1.0 (0.8-1.1) 


 


 


 





 


Activated Partial


Thromboplast Time 29 SEC (24-38) 


 


 


 





 


D-Dimer (Karely)


 1.39 ug/mlFEU


(0.00-0.50) 


 


 





 


Urine Collection Type  Unknown   


 


Urine Color (Auto)  Colorless   


 


Urine Turbidity  Clear   


 


Urine pH (Auto)  6.0 (<5.0-8.0)   


 


Urine Specific Gravity


 


 1.003


(1.000-1.030) 


 





 


Urine Protein (Auto)


 


 Negative mg/dL


(Negative) 


 





 


Urine Glucose (Auto)(UA)


 


 Negative mg/dL


(Negative) 


 





 


Urine Ketones (Auto)


 


 Negative mg/dL


(Negative) 


 





 


Urine Blood (Auto)


 


 Negative


(Negative) 


 





 


Urine Nitrite


 


 Negative


(Negative) 


 





 


Urine Bilirubin (Auto)


 


 Negative


(Negative) 


 





 


Urine Urobilinogen (Auto)


 


 Normal mg/dL


(Normal) 


 





 


Urine Leukocyte Esterase


(Auto) 


 Negative


(Negative) 


 





 


Urine RBC  0 /HPF (0-2)   


 


Urine WBC  0 /HPF (0-4)   


 


Urine Squamous Epithelial


Cells 


 Few /LPF 


 


 





 


Urine Bacteria  0 /HPF (0-FEW)   


 


Sodium Level


 


 


 144 mmol/L


(136-145) 





 


Potassium Level


 


 


 2.8 mmol/L


(3.5-5.1) 





 


Chloride Level


 


 


 107 mmol/L


() 





 


Carbon Dioxide Level


 


 


 28 mmol/L


(21-32) 





 


Anion Gap   9 (6-14)  


 


Blood Urea Nitrogen   8 mg/dL (7-20)  


 


Creatinine


 


 


 1.2 mg/dL


(0.6-1.0) 





 


Estimated GFR


(Cockcroft-Gault) 


 


 53.4 


 





 


BUN/Creatinine Ratio   7 (6-20)  


 


Glucose Level


 


 


 104 mg/dL


(70-99) 





 


Calcium Level


 


 


 8.2 mg/dL


(8.5-10.1) 





 


Total Bilirubin


 


 


 0.3 mg/dL


(0.2-1.0) 





 


Aspartate Amino Transf


(AST/SGOT) 


 


 30 U/L (15-37) 


 





 


Alanine Aminotransferase


(ALT/SGPT) 


 


 32 U/L (14-59) 


 





 


Alkaline Phosphatase


 


 


 113 U/L


() 





 


Troponin I High Sensitivity   27 ng/L (4-50)  26 ng/L (4-50) 


 


NT-Pro-B-Type Natriuretic


Peptide 


 


 116 pg/mL


(0-124) 





 


Total Protein


 


 


 7.2 g/dL


(6.4-8.2) 





 


Albumin


 


 


 3.2 g/dL


(3.4-5.0) 





 


Albumin/Globulin Ratio   0.8 (1.0-1.7)  


 


Lipase


 


 


 284 U/L


() 





 


Thyroid Stimulating Hormone


(TSH) 


 


 2.033 uIU/mL


(0.358-3.74) 














Images


Images


PORTABLE CHEST 1V





XR CHEST 1V





Clinical Indication: Reason: CP / Spl. Instructions:  / History: 





Comparison:  AP chest August 25, 2021.





Findings: 


The cardiomediastinal silhouette is normal. Lungs are clear. There is no 

pneumothorax. No pleural effusion is appreciated. No acute bone abnormality. 

There is arthropathy of the bilateral shoulders.





IMPRESSION: 


No acute cardiopulmonary process.





CT ANGIOGRAPHY CHEST





PQRS Compliance Statement:





One or more of the following individualized dose reduction techniques were 

utilized for this examination:  


1. Automated exposure control  


2. Adjustment of the mA and/or kV according to patient size  


3. Use of iterative reconstruction technique








CT CHEST WITH CONTRAST, PULMONARY ANGIOGRAM





History: Reason: CP, SOA, elevated dimer;OMNI 350,90ML / Spl. Instructions:  / 

History:  





Comparison: CTPA August 25, 2021.





Technique:  Helical CT of the chest was performed after the administration of 90

cc of Omnipaque 350 intravenous contrast according to PE protocol.   Axial and 

coronal reconstructions were obtained. 3-D MIP images were constructed to better

evaluate the pulmonary arteries.





Findings: 


Pulmonary arteries are adequately opacified. There is no evidence of pulmonary 

embolism.





There is no thoracic aortic dissection. There is coronary artery disease. The 

cardiac size is normal, no pericardial effusion. There are calcified right hilar

lymph nodes. There is no adenopathy in the chest.





There is no pleural abnormality. The central airways are patent. There is 

minimal atelectasis in the posterior right lower lobe. Stable tiny perifissural 

lymph nodes along the left major fissure.





The visualized upper abdomen is unremarkable.


There is no acute bone abnormality. The thoracic spine alignment is maintained.





IMPRESSION:


There is no pulmonary embolus.





VTE Prophylaxis Ordered


VTE Prophylaxis Devices:  No


VTE Pharmacological Prophylaxi:  Yes





Assessment/Plan


Assessment/Plan


Chest pain -with vomiting.  Given aspirin and nitroglycerin.  Cardiology 

consulted.  Has seen Dr. Mccall outpatient. Telemetry.


Vomiting - x1.  Possibly due to pain.  Could be MI symptoms will trend troponins

and consult cardiology admit to telemetry


Hypertension  -given dry cough and renal insufficiency will hold losartan and 

start amlodipine.


Schizophrenia -on Geodon and trazodone for auditory hallucinations


Hypokalemia -will replace IV check magnesium level.


Weakness - therapy eval, she lives alone other than behavioral health does not 

know what of the services she has





FEN - Cardiac diet ADAT


PPX - heparin


FULL CODE


Dispo - inpatient





Justifications for Admission


Other Justification














COBY PEREZ MD        May 12, 2022 07:09

## 2022-05-12 NOTE — EKG
[facility]

              [address] [place] [address]

Test Date:    2022               Test Time:    01:52:21

Pat Name:     LIV BRICENO       Department:   

Patient ID:   [id number]           Room:         [facility]

Gender:       F                        Technician:   

:          [id number]               Requested By: MELLY SANCHEZ

Order Number: [id number]           Reading MD:   Dane King

                                 Measurements

Intervals                              Axis          

Rate:         64                       P:            49

KS:           212                      QRS:          -24

QRSD:         88                       T:            32

QT:           440                                    

QTc:          454                                    

                           Interpretive Statements

SINUS RHYTHM

LEFTWARD AXIS

QRS(T) CONTOUR ABNORMALITY

CONSISTENT WITH ANTERIOR INFARCT

AGE UNDETERMINED

ABNORMAL ECG

Electronically Signed On 2022 14:48:39 CDT by Dane King

## 2022-05-12 NOTE — NUR
SS following for discharge planning. SS reviewed pt chart and discussed with pt RN. Pt is 
from home alone and is currently on room air. Cardiology following. NPO. PT/OT recommended 
home with home healthcare. SS will continue to follow for discharge planning.

## 2022-05-12 NOTE — PHYS DOC
Past Medical History


Past Medical History:  Hypertension, Other


Additional Past Medical Histor:  auditory hallucinations


Past Surgical History:  Hysterectomy


Smoking Status:  Never Smoker


Alcohol Use:  None


Drug Use:  None





Adult General


Chief Complaint


Chief Complaint:  CHEST PAIN





HPI


HPI





Patient is a 72  year old female presenting to the emergency department for 

evaluation of chest pain that she says woke her from her sleep shortly prior to 

arrival.  Patient says is very difficult for her to describe the pain as she 

describes it as possibly twisting and tightness in the center of her chest that 

made her feel short of breath and have 1 episode of nonbloody nonbilious emesis.

 She denies any diaphoresis at symptom onset.  She says she has a history of 

hypertension but no diabetes high cholesterol or smoking history.  She thinks 

she has had a stress test in the past but it has been many years ago.  She is in

no acute distress with normal vital signs.





Review of Systems


Review of Systems





Constitutional: Denies fever or chills []


Eyes: Denies change in visual acuity, redness, or eye pain []


HENT: Denies nasal congestion or sore throat []


Respiratory: Denies cough.  + shortness of breath []


Cardiovascular: Positive abdominal pain


GI: Denies abdominal pain.  + nausea, vomiting.  No bloody stools or diarrhea []


:  Denies dysuria or hematuria []


Musculoskeletal: Denies back pain or joint pain []


Integument: Denies rash or skin lesions []


Neurologic: Denies headache, focal weakness or sensory changes []





All other systems were reviewed and found to be within normal limits, except as 

documented in this note.





Allergies


Allergies





Allergies








Coded Allergies Type Severity Reaction Last Updated Verified


 


  No Known Drug Allergies    8/25/21 No











Physical Exam


Physical Exam





Constitutional: Well developed, well nourished, no acute distress, non-toxic 

appearance. []


HENT: Normocephalic, atraumatic, bilateral external ears normal, oropharynx mois

t, no oral exudates, nose normal. []


Eyes: PERRLA, EOMI, conjunctiva normal, no discharge. [] 


Neck: Normal range of motion, no tenderness, supple, no stridor. [] 


Cardiovascular:Heart rate regular rhythm, no murmur []


Lungs & Thorax:  Bilateral breath sounds clear to auscultation.  Positive 

sternal chest wall tenderness to palpation.


Abdomen: Bowel sounds normal, soft, no tenderness, no masses, no pulsatile 

masses. [] 


Skin: Warm, dry, no erythema, no rash. [] 


Back: No tenderness, no CVA tenderness. [] 


Extremities: No tenderness, no cyanosis, no clubbing, ROM intact, no edema. [] 


Neurologic: Alert and oriented X 3, normal motor function, normal sensory 

function, no focal deficits noted. []





EKG


EKG


Sinus rhythm at 64 bpm with leftward axis no ST elevation or depression with 

normal T waves but Q waves noted in the anterior leads.





Radiology/Procedures


Radiology/Procedures


[]





Course & Med Decision Making


Course & Med Decision Making


Patient has chest wall tenderness to palpation but she does have an abnormal EKG

 and risk factors that contribute to a heart score equal to 4.  I told patient 

that I cannot completely exclude coronary artery disease in the emergency 

department but acute coronary syndrome is less likely.  Given she is at moderate

 risk for adverse coronary event I recommended admission and she agreed.





Dragon Disclaimer


Dragon Disclaimer


This electronic medical record was generated, in whole or in part, using a voice

 recognition dictation system.





Departure


Departure


Impression:  


   Primary Impression:  


   Chest pain at rest


   Additional Impression:  


   Nausea & vomiting


Disposition:  09 ADMITTED AS INPATIENT


Admitting Physician:  ROMMEL (Gurmeet)


Condition:  IMPROVED


Referrals:  


CATALINO LIZARRAGA (PCP)





Problem Qualifiers











MELLY SANCHEZ DO             May 12, 2022 01:55

## 2022-05-12 NOTE — PDOC2
NENITA CHRISTINA APRN 5/12/22 0826:


CARDIAC CONSULT


DATE OF CONSULT


Date of Consult


DATE: 5/12/22 


TIME: 08:15





REASON FOR CONSULT


Reason for Consult:


Chest pain





REFERRING PHYSICIAN


Referring Physician:


Aden





SOURCE


Source:  Chart review, Patient





HISTORY OF PRESENT ILLNESS


HISTORY OF PRESENT ILLNESS


This is a pleasant 73 yo female admitted for complains of chest pain. Reports 

that this is sharp and midchest and presently reproducible with palpation. 

Complains of dizzy spells that started about 2 days ago. Had some SOA but none 

currently. No falls or any passing out. Had x1 vomiting and none further. She 

lives alone





PAST MEDICAL HISTORY


Cardiovascular:  HTN, Hyperlipidemia, Other (aortic arch aneurysm)


CENTRAL NERVOUS SYSTEM:  Other (tardive dyskinesia)


GI:  GERD


Psych:  Bipolar, Schizophrenia


Renal/:  Other (right renal cyst)


Endocrine:  Osteopenia





PAST SURGICAL HISTORY


Past Surgical History:  Hysterectomy





FAMILY HISTORY


Family History


noncontributory to CV





SOCIAL HISTORY


Smoke:  No


ALCOHOL:  none


Drugs:  None





CURRENT MEDICATIONS


CURRENT MEDICATIONS





Current Medications








 Medications


  (Trade)  Dose


 Ordered  Sig/Shahab


 Route


 PRN Reason  Start Time


 Stop Time Status Last Admin


Dose Admin


 


 Aspirin


  (Aspirin


 Chewable)  324 mg  1X  ONCE


 PO


   5/12/22 02:15


 5/12/22 02:16 DC 5/12/22 02:15





 


 Fentanyl Citrate


  (Fentanyl 2ml


 Vial)  50 mcg  1X  ONCE


 IVP


   5/12/22 02:15


 5/12/22 02:16 DC 5/12/22 02:15





 


 Ondansetron HCl


  (Zofran)  4 mg  1X  ONCE


 IVP


   5/12/22 02:15


 5/12/22 02:16 DC 5/12/22 02:15





 


 Sodium Chloride  1,000 ml @ 


 1,000 mls/hr  1X  ONCE


 IV


   5/12/22 02:15


 5/12/22 03:14 DC 5/12/22 02:15





 


 Morphine Sulfate


  (Morphine


 Sulfate)  2 mg  PRN Q2HR  PRN


 IVP


 PAIN  5/12/22 02:15


 5/13/22 02:14  5/12/22 05:50





 


 Iohexol


  (Omnipaque 350


 Mg/ml)  90 ml  1X  ONCE


 IV


   5/12/22 03:30


 5/12/22 03:31 DC 5/12/22 03:43





 


 Potassium Chloride


  (Klor-Con)  40 meq  1X  ONCE


 PO


   5/12/22 04:15


 5/12/22 04:16 DC 5/12/22 05:37





 


 Potassium


 Chloride/Water  100 ml @ 


 100 mls/hr  Q1H


 IV


   5/12/22 07:30


 5/12/22 11:29  5/12/22 07:21





 


 Heparin Sodium


  (Porcine)


  (Heparin Sodium)  5,000 unit  Q8HRS


 SQ


   5/12/22 07:30


    5/12/22 07:33














ALLERGIES


ALLERGIES:  


Coded Allergies:  


     No Known Drug Allergies (Unverified , 8/25/21)





ROS


Review of System


14 point ROS evaluated with pertinent positives noted per HPI





PHYSICAL EXAM


General:  Alert, Oriented X3, Cooperative, No acute distress


HEENT:  Atraumatic, Mucous membr. moist/pink


Lungs:  Clear to auscultation, Normal air movement


Heart:  Regular rate (3rd degree AV block), Normal S1, Normal S2, Other (2/6 

systolic apical murmur)


Extremities:  No cyanosis, No edema


Skin:  No breakdown, No significant lesion


Neuro:  Normal speech, Sensation intact


Psych/Mental Status:  Mental status NL, Other (anxious)


MUSCULOSKELETAL:  Osteoarthritic changes both hands





VITALS/I&O


VITALS/I&O:





                                   Vital Signs








  Date Time  Temp Pulse Resp B/P (MAP) Pulse Ox O2 Delivery O2 Flow Rate FiO2


 


5/12/22 06:56     100   


 


5/12/22 05:50      Room Air  


 


5/12/22 04:06 97.2 74 18 156/82 (106)    





 97.2       














                                    I & O   


 


 5/11/22 5/11/22 5/12/22





 15:00 23:00 07:00


 


Output Total   1050 ml


 


Balance   -1050 ml











LABS


Lab:





                                Laboratory Tests








Test


 5/12/22


01:58 5/12/22


02:21 5/12/22


02:55 5/12/22


06:30


 


White Blood Count


 5.0 x10^3/uL


(4.0-11.0) 


 


 





 


Red Blood Count


 3.19 x10^6/uL


(3.50-5.40)  L 


 


 





 


Hemoglobin


 10.2 g/dL


(12.0-15.5)  L 


 


 





 


Hematocrit


 29.8 %


(36.0-47.0)  L 


 


 





 


Mean Corpuscular Volume


 93 fL ()


 


 


 





 


Mean Corpuscular Hemoglobin 32 pg (25-35)     


 


Mean Corpuscular Hemoglobin


Concent 34 g/dL


(31-37) 


 


 





 


Red Cell Distribution Width


 14.6 %


(11.5-14.5)  H 


 


 





 


Platelet Count


 237 x10^3/uL


(140-400) 


 


 





 


Neutrophils (%) (Auto) 32 % (31-73)     


 


Lymphocytes (%) (Auto) 49 % (24-48)  H   


 


Monocytes (%) (Auto) 14 % (0-9)  H   


 


Eosinophils (%) (Auto) 4 % (0-3)  H   


 


Basophils (%) (Auto) 1 % (0-3)     


 


Neutrophils # (Auto)


 1.6 x10^3/uL


(1.8-7.7)  L 


 


 





 


Lymphocytes # (Auto)


 2.5 x10^3/uL


(1.0-4.8) 


 


 





 


Monocytes # (Auto)


 0.7 x10^3/uL


(0.0-1.1) 


 


 





 


Eosinophils # (Auto)


 0.2 x10^3/uL


(0.0-0.7) 


 


 





 


Basophils # (Auto)


 0.1 x10^3/uL


(0.0-0.2) 


 


 





 


Prothrombin Time


 12.6 SEC


(11.7-14.0) 


 


 





 


Prothrombin Time INR 1.0 (0.8-1.1)     


 


Activated Partial


Thromboplast Time 29 SEC (24-38)


 


 


 





 


D-Dimer (Karely)


 1.39 ug/mlFEU


(0.00-0.50)  H 


 


 





 


Urine Collection Type  Unknown    


 


Urine Color (Auto)  Colorless    


 


Urine Turbidity  Clear    


 


Urine pH (Auto)


 


 6.0 (<5.0-8.0)


 


 





 


Urine Specific Gravity


 


 1.003


(1.000-1.030) 


 





 


Urine Protein (Auto)


 


 Negative mg/dL


(Negative) 


 





 


Urine Glucose (Auto)(UA)


 


 Negative mg/dL


(Negative) 


 





 


Urine Ketones (Auto)


 


 Negative mg/dL


(Negative) 


 





 


Urine Blood (Auto)


 


 Negative


(Negative) 


 





 


Urine Nitrite


 


 Negative


(Negative) 


 





 


Urine Bilirubin (Auto)


 


 Negative


(Negative) 


 





 


Urine Urobilinogen (Auto)


 


 Normal mg/dL


(Normal) 


 





 


Urine Leukocyte Esterase


(Auto) 


 Negative


(Negative) 


 





 


Urine RBC  0 /HPF (0-2)    


 


Urine WBC  0 /HPF (0-4)    


 


Urine Squamous Epithelial


Cells 


 Few /LPF  


 


 





 


Urine Bacteria


 


 0 /HPF (0-FEW)


 


 





 


Sodium Level


 


 


 144 mmol/L


(136-145) 





 


Potassium Level


 


 


 2.8 mmol/L


(3.5-5.1)  *L 





 


Chloride Level


 


 


 107 mmol/L


() 





 


Carbon Dioxide Level


 


 


 28 mmol/L


(21-32) 





 


Anion Gap   9 (6-14)   


 


Blood Urea Nitrogen


 


 


 8 mg/dL (7-20)


 





 


Creatinine


 


 


 1.2 mg/dL


(0.6-1.0)  H 





 


Estimated GFR


(Cockcroft-Gault) 


 


 53.4  


 





 


BUN/Creatinine Ratio   7 (6-20)   


 


Glucose Level


 


 


 104 mg/dL


(70-99)  H 





 


Calcium Level


 


 


 8.2 mg/dL


(8.5-10.1)  L 





 


Total Bilirubin


 


 


 0.3 mg/dL


(0.2-1.0) 





 


Aspartate Amino Transferase


(AST) 


 


 30 U/L (15-37)


 





 


Alanine Aminotransferase (ALT)


 


 


 32 U/L (14-59)


 





 


Alkaline Phosphatase


 


 


 113 U/L


() 





 


Troponin I High Sensitivity


 


 


 27 ng/L (4-50)


 26 ng/L (4-50)





 


NT-Pro-B-Type Natriuretic


Peptide 


 


 116 pg/mL


(0-124) 





 


Total Protein


 


 


 7.2 g/dL


(6.4-8.2) 





 


Albumin


 


 


 3.2 g/dL


(3.4-5.0)  L 





 


Albumin/Globulin Ratio


 


 


 0.8 (1.0-1.7)


L 





 


Lipase


 


 


 284 U/L


() 





 


Thyroid Stimulating Hormone


(TSH) 


 


 2.033 uIU/mL


(0.358-3.74) 





 


Magnesium Level


 


 


 


 2.1 mg/dL


(1.8-2.4)





                                Laboratory Tests


5/12/22 01:58








                                Laboratory Tests


5/12/22 02:55











ECHOCARDIOGRAM


ECHOCARDIOGRAM





<Conclusion>


The left ventricle is normal size.


The left ventricular systolic function is normal and the ejection fraction is 

within normal range.


The Ejection Fraction is 50-55%.


There is mild concentric left ventricular hypertrophy.


Doppler and Color Flow revealed trace aortic regurgitation.


There is no significant aortic valvular stenosis.


Doppler and Color-flow revealed mild mitral regurgitation.


Doppler and Color Flow revealed mild tricuspid regurgitation with an estimated 

PAP of 31 mmHg.








DATE:     01/25/21 [id number]





ASSESSMENT/PLAN


ASSESSMENT/PLAN


1. Atypical chest pain: reproducible. EKG SR with first degree AV block no acute

changes, trops nml


2. HTN: labile episodes, better


3. HLP


4. Hx of Schizophrenia and tardive dyskinesia


5. Hypokalemia


6. GERD


7. 3rd degree heart block: Intermittent as an inpt. complained of intermittent 

dizzy spells at home





Recommendations


1. TTE


2. Will need PPM, Pt currently hesitant, Primary cardiologist to discuss with pt


3. Replace K


4. Resume home BP regimen





SANYA CHANDRA MD 5/12/22 1658:


CARDIAC CONSULT


ASSESSMENT/PLAN


ASSESSMENT/PLAN


Patient seen and examined


She is more comfortable this afternoon.


I agree with our nurse practitioners assessment and plan.


Atypical chest pain: reproducible. EKG SR with first degree AV block no acute 

changes, trops nml.  Medical treatment.  Echo pending


HTN: labile episodes, better


HLP


Hx of Schizophrenia and tardive dyskinesia


Hypokalemia.  Replacing potassium.


GERD


3rd degree heart block: Intermittent as an inpt. complained of intermittent 

dizzy spells at home.  Will replace potassium.  Hold nifedipine.  Continue to 

monitor.  Probable outpatient monitoring.











NENITA CHRISTINA          May 12, 2022 08:26


SANYA CHANDRA MD            May 12, 2022 16:58

## 2022-05-13 VITALS — DIASTOLIC BLOOD PRESSURE: 80 MMHG | SYSTOLIC BLOOD PRESSURE: 139 MMHG

## 2022-05-13 VITALS — SYSTOLIC BLOOD PRESSURE: 153 MMHG | DIASTOLIC BLOOD PRESSURE: 75 MMHG

## 2022-05-13 VITALS — SYSTOLIC BLOOD PRESSURE: 118 MMHG | DIASTOLIC BLOOD PRESSURE: 58 MMHG

## 2022-05-13 LAB
ANION GAP SERPL CALC-SCNC: 7 MMOL/L (ref 6–14)
BASOPHILS # BLD AUTO: 0 X10^3/UL (ref 0–0.2)
BASOPHILS NFR BLD: 1 % (ref 0–3)
BUN SERPL-MCNC: 11 MG/DL (ref 7–20)
CALCIUM SERPL-MCNC: 8.7 MG/DL (ref 8.5–10.1)
CHLORIDE SERPL-SCNC: 112 MMOL/L (ref 98–107)
CO2 SERPL-SCNC: 25 MMOL/L (ref 21–32)
CREAT SERPL-MCNC: 1.2 MG/DL (ref 0.6–1)
EOSINOPHIL NFR BLD: 0.3 X10^3/UL (ref 0–0.7)
EOSINOPHIL NFR BLD: 9 % (ref 0–3)
ERYTHROCYTE [DISTWIDTH] IN BLOOD BY AUTOMATED COUNT: 14.6 % (ref 11.5–14.5)
GFR SERPLBLD BASED ON 1.73 SQ M-ARVRAT: 53.4 ML/MIN
GLUCOSE SERPL-MCNC: 94 MG/DL (ref 70–99)
HCT VFR BLD CALC: 31.5 % (ref 36–47)
HGB BLD-MCNC: 10.5 G/DL (ref 12–15.5)
LYMPHOCYTES # BLD: 1.9 X10^3/UL (ref 1–4.8)
LYMPHOCYTES NFR BLD AUTO: 46 % (ref 24–48)
MCH RBC QN AUTO: 32 PG (ref 25–35)
MCHC RBC AUTO-ENTMCNC: 33 G/DL (ref 31–37)
MCV RBC AUTO: 94 FL (ref 79–100)
MONO #: 0.4 X10^3/UL (ref 0–1.1)
MONOCYTES NFR BLD: 10 % (ref 0–9)
NEUT #: 1.3 X10^3/UL (ref 1.8–7.7)
NEUTROPHILS NFR BLD AUTO: 34 % (ref 31–73)
PLATELET # BLD AUTO: 245 X10^3/UL (ref 140–400)
POTASSIUM SERPL-SCNC: 3.8 MMOL/L (ref 3.5–5.1)
RBC # BLD AUTO: 3.33 X10^6/UL (ref 3.5–5.4)
SODIUM SERPL-SCNC: 144 MMOL/L (ref 136–145)
WBC # BLD AUTO: 4 X10^3/UL (ref 4–11)

## 2022-05-13 RX ADMIN — HEPARIN SODIUM SCH UNIT: 5000 INJECTION, SOLUTION INTRAVENOUS; SUBCUTANEOUS at 14:00

## 2022-05-13 RX ADMIN — ASPIRIN SCH MG: 325 TABLET, DELAYED RELEASE ORAL at 08:00

## 2022-05-13 RX ADMIN — HEPARIN SODIUM SCH UNIT: 5000 INJECTION, SOLUTION INTRAVENOUS; SUBCUTANEOUS at 06:03

## 2022-05-13 NOTE — NUR
SS following up with discharge planning. SS reviewed pt chart and discussed with pt RN. Pt 
is currently on room air. Cardiology following. Pt refusing Pacemaker placement. PO diet. Pt 
has outpatient services at [facility]. Pt to have outpatient heart monitor. 
PT/OT recommended home with home healthcare. SS met with pt and pt agreeable to home 
healthcare services with no preference of company. Referral sent to [facility], 
[phone]; fax [phone]. Pt's RN and physician notified. SS will continue to follow 
for discharge planning. 

-------------------------------------------------------------------------------

Addendum: 05/13/22 at 1127 by PAULA XIAO SS

-------------------------------------------------------------------------------

Discharge orders received for home with home healthcare and sent to [facility].

## 2022-05-13 NOTE — PDOC
CARDIO Progress Notes


Date and Time


Date of Service


5/13/2022


Time of Evaluation


0935





Subjective


Subjective:  No Chest Pain, No shortness of breath, No Palpitations





Vitals


Vitals





Vital Signs








  Date Time  Temp Pulse Resp B/P (MAP) Pulse Ox O2 Delivery O2 Flow Rate FiO2


 


5/13/22 08:48  52  153/75    


 


5/13/22 07:31 98.8  18  98 Room Air  





 98.8       


 


5/12/22 14:45       1.0 








Weight


Weight [ ]





Input and Output


Intake and Output











Intake and Output 


 


 5/13/22





 07:00


 


Intake Total 200 ml


 


Output Total 600 ml


 


Balance -400 ml


 


 


 


Intake Oral 200 ml


 


Output Urine Total 600 ml


 


# Voids 1


 


# Bowel Movements 1











Laboratory


Labs





Laboratory Tests








Test


 5/13/22


07:45


 


White Blood Count


 4.0 x10^3/uL


(4.0-11.0)


 


Red Blood Count


 3.33 x10^6/uL


(3.50-5.40)


 


Hemoglobin


 10.5 g/dL


(12.0-15.5)


 


Hematocrit


 31.5 %


(36.0-47.0)


 


Mean Corpuscular Volume 94 fL () 


 


Mean Corpuscular Hemoglobin 32 pg (25-35) 


 


Mean Corpuscular Hemoglobin


Concent 33 g/dL


(31-37)


 


Red Cell Distribution Width


 14.6 %


(11.5-14.5)


 


Platelet Count


 245 x10^3/uL


(140-400)


 


Neutrophils (%) (Auto) 34 % (31-73) 


 


Lymphocytes (%) (Auto) 46 % (24-48) 


 


Monocytes (%) (Auto) 10 % (0-9) 


 


Eosinophils (%) (Auto) 9 % (0-3) 


 


Basophils (%) (Auto) 1 % (0-3) 


 


Neutrophils # (Auto)


 1.3 x10^3/uL


(1.8-7.7)


 


Lymphocytes # (Auto)


 1.9 x10^3/uL


(1.0-4.8)


 


Monocytes # (Auto)


 0.4 x10^3/uL


(0.0-1.1)


 


Eosinophils # (Auto)


 0.3 x10^3/uL


(0.0-0.7)


 


Basophils # (Auto)


 0.0 x10^3/uL


(0.0-0.2)


 


Sodium Level


 144 mmol/L


(136-145)


 


Potassium Level


 3.8 mmol/L


(3.5-5.1)


 


Chloride Level


 112 mmol/L


()


 


Carbon Dioxide Level


 25 mmol/L


(21-32)


 


Anion Gap 7 (6-14) 


 


Blood Urea Nitrogen


 11 mg/dL


(7-20)


 


Creatinine


 1.2 mg/dL


(0.6-1.0)


 


Estimated GFR


(Cockcroft-Gault) 53.4 





 


Glucose Level


 94 mg/dL


(70-99)


 


Calcium Level


 8.7 mg/dL


(8.5-10.1)











Physical Exam


HEENT:  Neck Supple W Full Motion


Chest:  Symmetric


LUNGS:  Clear to Auscultation


Heart:  S1S2, RRR (SR)


Abdomen:  Soft N/T


Extremities:  No Calf Tenderness


Neurology:  alert, oriented, follow commands





Assessment


Assessment


1. Atypical chest pain: reproducible. EKG SR with first degree AV block no acute

changes, trops nml. EF and WM nml


2. HTN: labile episodes, better


3. HLP


4. Hx of Schizophrenia and tardive dyskinesia


5. Hypokalemia: replaced


6. GERD


7. 3rd degree heart block: Intermittent as an inpt. complained of intermittent 

dizzy spells at home. Presently in SR





Recommendations


1. Hesitant about PPM. Presently in SR will need MCOT at least but I believe she

is not able to comprehend the need for monitoring and possible PPM need. Will 

need to talk to daughter Aracely but unable to contact currently to further 

explain plan


2. Resume home BP regimen, agree with norvasc


3. Follow up in office





Justicifation of Admission Dx:


Justifications for Admission:


Justification of Admission Dx:  NENITA Escobar APRN          May 13, 2022 09:57

## 2022-05-13 NOTE — SNU/HH DC
DISCHARGE WITH HOME HEALTH


DISCHARGE INFORMATION:


Discharge Date:  May 13, 2022


Final Diagnosis:


Problems


Medical Problems:


(1) Chest pain at rest


Status: Acute  





(2) Nausea & vomiting


Status: Acute  








Condition on Discharge:  Stable





CODE STATUS:


Code Status:  Full





HOME HEALTH:


Face to Face:


I certify this patient is under my care and that I, or a nurse practitioner or 

physician's assistant working with me, had a face to face encounter that meets 

the physician face to face encounter requirements with this patient on 

5/13/2022.


Medical Complications:  Other (Complete heart block)


Skilled Nursing For:  Assess/Skilled Observatio, Medication Management, Other: 

(Cardiac monitor)


RN For Eval/Treatment:  Yes


Physical Therapy For:  Evalulation/Treatment


Pt Meets Homebound Status:  Poor coordination w/ amb., Fatigue w/ amb.





POST DISCHARGE ORDERS:


Activity Instructions for Disc:  No restrictions


Weight Bearing Status after Di:  No restrictions


DIET AFTER DISCHARGE:  Cardiac


Wound/Incision Care:  No wound care needed





CHECKS AFTER DISCHARGE:


Checks after discharge:  Check blood press - daily, Weigh Yourself Daily





FOLLOW-UP:


Additional Instructions:


Call [phone] to follow up with cardiology for pacemaker placement 

consideration


[id number]


[place] [address]





TREATMENT/EQUIPMENT ORDERS:


Adaptive Equipment Issued:  None





CERTIFICATION STATEMENT:


Certification Statement:


Certification Statement: Based on the above finding, I certify that this patient

is confined to the home and needs intermittent skilled nursing care, physical 

therapy and/or speech therapy, or continues to need occupational therapy.~ This 

patient is under my care, and I have initiated the establishment of the plan of 

care.~ This patient will be followed by myself or a community physician who will

periodically review the plan of care.


Home Meds


Reported Medications


Haloperidol Decanoate (HALOPERIDOL DECANOATE) 100 Mg/1 Ml Vial, 100 MG IM Q4WK 

for Schitzophrenia , EACH


   5/12/22


Trazodone Hcl (TRAZODONE HCL) 150 Mg Tablet, 1 TAB PO QHS for Sleep,anxiety,and 

depression for 30 Days, #30 TAB 0 Refills


   5/12/22


Benztropine Mesylate (BENZTROPINE MESYLATE) 1 Mg Tablet, 1 TAB PO QHS for 

tremors, #60 TAB


   5/12/22


[cogentin]   No Conflict Check


   5/12/22


Losartan Potassium (LOSARTAN POTASSIUM) 100 Mg Tablet, 100 MG PO DAILY, TAB


   5/14/15


Discontinued Reported Medications


Nifedipine (NIFEDIPINE ER) 30 Mg Tab.er.24, 1 TAB PO DAILY for High blood 

pressure, #90 TAB 1 Refill


   5/12/22











COBY PEREZ MD        May 13, 2022 11:19

## 2022-05-13 NOTE — NUR
DISCHARGED PATIENT TO HOME WITH HOME HEALTH. EVENT MONITOR APPLIED PER INSTRUCTIONS AND 
PROVIDED INFORMATIONS TO PATIENT AND FAMILY. PIV AND MONITOR REMOVED. ESCORTED PATIENT OFF 
INTO A PRIVATE VEHICLE.

## 2022-05-13 NOTE — PDOC
[facility] PROGRESS NOTE


Date of Service


DOS:


DATE: 5/13/22 


TIME: 11:13





Chief Complaint


Chief Complaint


3rd degree heart block - intermittent. Needs outpatient monitoring and likely 

definitive treatment


Chest pain -likely due to above cardiology consulted.  Has seen Dr. Mccall 

outpatient. Telemetry.


Vomiting - x1.  Possibly due to pain.  Resolved


Hypertension  -given dry cough and renal insufficiency will hold losartan and 

start amlodipine.


Schizophrenia -on Geodon and trazodone for auditory hallucinations.  On 

benztropine.  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


Hypokalemia -replace


Weakness - therapy eval, she lives alone other than behavioral health does not 

know what of the services she has, she is amenable to home health





FEN - Cardiac diet ADAT


PPX - heparin


FULL CODE


Dispo - inpatient





History of Present Illness


History of Present Illness


Ms Lutz is a 71yo female with PMHx HTN and schizophrenia who comes to ED 

via EMS c/o chest pain that woke her up out of sleep.


Chest pain and cough has been going past day prior to arrival the pain gets 

worse.  Woke her from sleep.  She notes her cough is nonproductive


Labs with WBC 5, Hb 10.2, platelets 237, PT 12.6, INR 1, PTT 29, D-dimer 1.39, 

, K2.8, BUN 8, CR 1.2, glucose 104, calcium 8.2, bilirubin 0.3, AST 30, 

ALT 32, alkaline phosphatase 113, albumin 3.2, lipase 284, TSH 2.033, NT proBNP 

116, initial troponin was 27 and repeat is 26.  Urinalysis bland.  Chest 

radiograph with no acute abnormalities.  Due to elevated D-dimer she underwent 

CTPA which was negative for pulmonary embolism.


EKG my interpretation sinus rhythm rate of 64 bpm with leftward axis DC interval

212  first degree heart block, no ST segment elevations no T WI.


After initial evaluation potassium replacement.  Chest pain has improved.  She 

notes she does not know all her medications that she follows with [place] [facility] and gets all her medications sent to her and blister 

packs.  She does note she had a telemedicine visit and had a medication change 

recently one of them made her vomit she does not know which.





5/13: Chest pain resolved.  She has been intermittently going into complete 

heart block.  Evaluated by cardiology recommended for permanent pacemaker 

placement she refuses.  I discussed with family they noted they will  her

and she is amenable to having a outpatient cardiac monitor and home health in 

the interim potassium improved and adjusted medications to hold nifedipine.





Vitals/I&O


Vitals/I&O:





                                   Vital Signs








  Date Time  Temp Pulse Resp B/P (MAP) Pulse Ox O2 Delivery O2 Flow Rate FiO2


 


5/13/22 08:48  52  153/75    


 


5/13/22 08:00      Room Air  


 


5/13/22 07:31 98.8  18  98   





 98.8       


 


5/12/22 14:45       1.0 














                                    I & O   


 


 5/12/22 5/12/22 5/13/22





 15:00 23:00 07:00


 


Intake Total  0 ml 200 ml


 


Output Total   600 ml


 


Balance  0 ml -400 ml











Physical Exam


General:  Alert, Oriented X3, Cooperative, No acute distress


Heart:  Regular rate (3rd degree AV block), Normal S1, Normal S2, Other (2/6 

systolic apical murmur)


Abdomen:  Normal bowel sounds, Soft, No hepatosplenomegaly, No masses, Other 

(Epigastric tenderness)


Extremities:  No cyanosis, No edema


Skin:  No breakdown, No significant lesion





Labs


Labs:





Laboratory Tests








Test


 5/13/22


07:45


 


White Blood Count


 4.0 x10^3/uL


(4.0-11.0)


 


Red Blood Count


 3.33 x10^6/uL


(3.50-5.40)


 


Hemoglobin


 10.5 g/dL


(12.0-15.5)


 


Hematocrit


 31.5 %


(36.0-47.0)


 


Mean Corpuscular Volume 94 fL () 


 


Mean Corpuscular Hemoglobin 32 pg (25-35) 


 


Mean Corpuscular Hemoglobin


Concent 33 g/dL


(31-37)


 


Red Cell Distribution Width


 14.6 %


(11.5-14.5)


 


Platelet Count


 245 x10^3/uL


(140-400)


 


Neutrophils (%) (Auto) 34 % (31-73) 


 


Lymphocytes (%) (Auto) 46 % (24-48) 


 


Monocytes (%) (Auto) 10 % (0-9) 


 


Eosinophils (%) (Auto) 9 % (0-3) 


 


Basophils (%) (Auto) 1 % (0-3) 


 


Neutrophils # (Auto)


 1.3 x10^3/uL


(1.8-7.7)


 


Lymphocytes # (Auto)


 1.9 x10^3/uL


(1.0-4.8)


 


Monocytes # (Auto)


 0.4 x10^3/uL


(0.0-1.1)


 


Eosinophils # (Auto)


 0.3 x10^3/uL


(0.0-0.7)


 


Basophils # (Auto)


 0.0 x10^3/uL


(0.0-0.2)


 


Sodium Level


 144 mmol/L


(136-145)


 


Potassium Level


 3.8 mmol/L


(3.5-5.1)


 


Chloride Level


 112 mmol/L


()


 


Carbon Dioxide Level


 25 mmol/L


(21-32)


 


Anion Gap 7 (6-14) 


 


Blood Urea Nitrogen


 11 mg/dL


(7-20)


 


Creatinine


 1.2 mg/dL


(0.6-1.0)


 


Estimated GFR


(Cockcroft-Gault) 53.4 





 


Glucose Level


 94 mg/dL


(70-99)


 


Calcium Level


 8.7 mg/dL


(8.5-10.1)











Assessment and Plan


Assessmemt and Plan


Problems


Medical Problems:


(1) Chest pain at rest


Status: Acute  





(2) Nausea & vomiting


Status: Acute  











Comment


Review of Relevant


I have reviewed the following items yareli (where applicable) has been applied.


Medications:





Current Medications








 Medications


  (Trade)  Dose


 Ordered  Sig/Shahab


 Route


 PRN Reason  Start Time


 Stop Time Status Last Admin


Dose Admin


 


 Trazodone HCl


  (Desyrel)  100 mg  HS


 PO


   5/12/22 21:00


    5/12/22 20:40





 


 Benztropine


 Mesylate


  (Cogentin)  1 mg  QHS


 PO


   5/12/22 21:00


    5/12/22 20:40














Justifications for Admission


Other Justification














COBY PEREZ MD        May 13, 2022 11:16

## 2022-05-13 NOTE — PDOC3
Discharge Summary


Visit Information


Date of Admission:  May 12, 2022


Date of Discharge:  May 13, 2022


Admitting Diagnosis:  Chest pain


Final Diagnosis


Problems


Medical Problems:


(1) Chest pain at rest


Status: Acute  





(2) Nausea & vomiting


Status: Acute  











Brief Hospital Course


Allergies





                                    Allergies








Coded Allergies Type Severity Reaction Last Updated Verified


 


  No Known Drug Allergies    8/25/21 No








Vital Signs





Vital Signs








  Date Time  Temp Pulse Resp B/P (MAP) Pulse Ox O2 Delivery O2 Flow Rate FiO2


 


5/13/22 08:48  52  153/75    


 


5/13/22 08:00      Room Air  


 


5/13/22 07:31 98.8  18  98   





 98.8       


 


5/12/22 14:45       1.0 








Lab Results





Laboratory Tests








Test


 5/12/22


01:58 5/12/22


02:21 5/12/22


02:55 5/12/22


06:30


 


White Blood Count


 5.0 x10^3/uL


(4.0-11.0) 


 


 





 


Red Blood Count


 3.19 x10^6/uL


(3.50-5.40) 


 


 





 


Hemoglobin


 10.2 g/dL


(12.0-15.5) 


 


 





 


Hematocrit


 29.8 %


(36.0-47.0) 


 


 





 


Mean Corpuscular Volume 93 fL ()    


 


Mean Corpuscular Hemoglobin 32 pg (25-35)    


 


Mean Corpuscular Hemoglobin


Concent 34 g/dL


(31-37) 


 


 





 


Red Cell Distribution Width


 14.6 %


(11.5-14.5) 


 


 





 


Platelet Count


 237 x10^3/uL


(140-400) 


 


 





 


Neutrophils (%) (Auto) 32 % (31-73)    


 


Lymphocytes (%) (Auto) 49 % (24-48)    


 


Monocytes (%) (Auto) 14 % (0-9)    


 


Eosinophils (%) (Auto) 4 % (0-3)    


 


Basophils (%) (Auto) 1 % (0-3)    


 


Neutrophils # (Auto)


 1.6 x10^3/uL


(1.8-7.7) 


 


 





 


Lymphocytes # (Auto)


 2.5 x10^3/uL


(1.0-4.8) 


 


 





 


Monocytes # (Auto)


 0.7 x10^3/uL


(0.0-1.1) 


 


 





 


Eosinophils # (Auto)


 0.2 x10^3/uL


(0.0-0.7) 


 


 





 


Basophils # (Auto)


 0.1 x10^3/uL


(0.0-0.2) 


 


 





 


Prothrombin Time


 12.6 SEC


(11.7-14.0) 


 


 





 


Prothromb Time International


Ratio 1.0 (0.8-1.1) 


 


 


 





 


Activated Partial


Thromboplast Time 29 SEC (24-38) 


 


 


 





 


D-Dimer (Karely)


 1.39 ug/mlFEU


(0.00-0.50) 


 


 





 


Urine Collection Type  Unknown   


 


Urine Color (Auto)  Colorless   


 


Urine Turbidity  Clear   


 


Urine pH (Auto)  6.0 (<5.0-8.0)   


 


Urine Specific Gravity


 


 1.003


(1.000-1.030) 


 





 


Urine Protein (Auto)


 


 Negative mg/dL


(Negative) 


 





 


Urine Glucose (Auto)(UA)


 


 Negative mg/dL


(Negative) 


 





 


Urine Ketones (Auto)


 


 Negative mg/dL


(Negative) 


 





 


Urine Blood (Auto)


 


 Negative


(Negative) 


 





 


Urine Nitrite


 


 Negative


(Negative) 


 





 


Urine Bilirubin (Auto)


 


 Negative


(Negative) 


 





 


Urine Urobilinogen (Auto)


 


 Normal mg/dL


(Normal) 


 





 


Urine Leukocyte Esterase


(Auto) 


 Negative


(Negative) 


 





 


Urine RBC  0 /HPF (0-2)   


 


Urine WBC  0 /HPF (0-4)   


 


Urine Squamous Epithelial


Cells 


 Few /LPF 


 


 





 


Urine Bacteria  0 /HPF (0-FEW)   


 


Sodium Level


 


 


 144 mmol/L


(136-145) 





 


Potassium Level


 


 


 2.8 mmol/L


(3.5-5.1) 





 


Chloride Level


 


 


 107 mmol/L


() 





 


Carbon Dioxide Level


 


 


 28 mmol/L


(21-32) 





 


Anion Gap   9 (6-14)  


 


Blood Urea Nitrogen   8 mg/dL (7-20)  


 


Creatinine


 


 


 1.2 mg/dL


(0.6-1.0) 





 


Estimated GFR


(Cockcroft-Gault) 


 


 53.4 


 





 


BUN/Creatinine Ratio   7 (6-20)  


 


Glucose Level


 


 


 104 mg/dL


(70-99) 





 


Calcium Level


 


 


 8.2 mg/dL


(8.5-10.1) 





 


Total Bilirubin


 


 


 0.3 mg/dL


(0.2-1.0) 





 


Aspartate Amino Transf


(AST/SGOT) 


 


 30 U/L (15-37) 


 





 


Alanine Aminotransferase


(ALT/SGPT) 


 


 32 U/L (14-59) 


 





 


Alkaline Phosphatase


 


 


 113 U/L


() 





 


Troponin I High Sensitivity   27 ng/L (4-50)  26 ng/L (4-50) 


 


NT-Pro-B-Type Natriuretic


Peptide 


 


 116 pg/mL


(0-124) 





 


Total Protein


 


 


 7.2 g/dL


(6.4-8.2) 





 


Albumin


 


 


 3.2 g/dL


(3.4-5.0) 





 


Albumin/Globulin Ratio   0.8 (1.0-1.7)  


 


Lipase


 


 


 284 U/L


() 





 


Thyroid Stimulating Hormone


(TSH) 


 


 2.033 uIU/mL


(0.358-3.74) 





 


Magnesium Level


 


 


 


 2.1 mg/dL


(1.8-2.4)


 


Triglycerides Level


 


 


 


 69 mg/dL


(0-150)


 


Cholesterol Level


 


 


 


 150 mg/dL


(0-200)


 


LDL Cholesterol, Calculated


 


 


 


 89 mg/dL


(0-100)


 


VLDL Cholesterol, Calculated


 


 


 


 14 mg/dL


(0-40)


 


Non-HDL Cholesterol Calculated


 


 


 


 103 mg/dL


(0-129)


 


HDL Cholesterol


 


 


 


 47 mg/dL


(40-60)


 


Cholesterol/HDL Ratio    3.2 


 


Test


 5/13/22


07:45 


 


 





 


White Blood Count


 4.0 x10^3/uL


(4.0-11.0) 


 


 





 


Red Blood Count


 3.33 x10^6/uL


(3.50-5.40) 


 


 





 


Hemoglobin


 10.5 g/dL


(12.0-15.5) 


 


 





 


Hematocrit


 31.5 %


(36.0-47.0) 


 


 





 


Mean Corpuscular Volume 94 fL ()    


 


Mean Corpuscular Hemoglobin 32 pg (25-35)    


 


Mean Corpuscular Hemoglobin


Concent 33 g/dL


(31-37) 


 


 





 


Red Cell Distribution Width


 14.6 %


(11.5-14.5) 


 


 





 


Platelet Count


 245 x10^3/uL


(140-400) 


 


 





 


Neutrophils (%) (Auto) 34 % (31-73)    


 


Lymphocytes (%) (Auto) 46 % (24-48)    


 


Monocytes (%) (Auto) 10 % (0-9)    


 


Eosinophils (%) (Auto) 9 % (0-3)    


 


Basophils (%) (Auto) 1 % (0-3)    


 


Neutrophils # (Auto)


 1.3 x10^3/uL


(1.8-7.7) 


 


 





 


Lymphocytes # (Auto)


 1.9 x10^3/uL


(1.0-4.8) 


 


 





 


Monocytes # (Auto)


 0.4 x10^3/uL


(0.0-1.1) 


 


 





 


Eosinophils # (Auto)


 0.3 x10^3/uL


(0.0-0.7) 


 


 





 


Basophils # (Auto)


 0.0 x10^3/uL


(0.0-0.2) 


 


 





 


Sodium Level


 144 mmol/L


(136-145) 


 


 





 


Potassium Level


 3.8 mmol/L


(3.5-5.1) 


 


 





 


Chloride Level


 112 mmol/L


() 


 


 





 


Carbon Dioxide Level


 25 mmol/L


(21-32) 


 


 





 


Anion Gap 7 (6-14)    


 


Blood Urea Nitrogen


 11 mg/dL


(7-20) 


 


 





 


Creatinine


 1.2 mg/dL


(0.6-1.0) 


 


 





 


Estimated GFR


(Cockcroft-Gault) 53.4 


 


 


 





 


Glucose Level


 94 mg/dL


(70-99) 


 


 





 


Calcium Level


 8.7 mg/dL


(8.5-10.1) 


 


 











Laboratory Tests








Test


 5/13/22


07:45


 


White Blood Count


 4.0 x10^3/uL


(4.0-11.0)


 


Red Blood Count


 3.33 x10^6/uL


(3.50-5.40)


 


Hemoglobin


 10.5 g/dL


(12.0-15.5)


 


Hematocrit


 31.5 %


(36.0-47.0)


 


Mean Corpuscular Volume 94 fL () 


 


Mean Corpuscular Hemoglobin 32 pg (25-35) 


 


Mean Corpuscular Hemoglobin


Concent 33 g/dL


(31-37)


 


Red Cell Distribution Width


 14.6 %


(11.5-14.5)


 


Platelet Count


 245 x10^3/uL


(140-400)


 


Neutrophils (%) (Auto) 34 % (31-73) 


 


Lymphocytes (%) (Auto) 46 % (24-48) 


 


Monocytes (%) (Auto) 10 % (0-9) 


 


Eosinophils (%) (Auto) 9 % (0-3) 


 


Basophils (%) (Auto) 1 % (0-3) 


 


Neutrophils # (Auto)


 1.3 x10^3/uL


(1.8-7.7)


 


Lymphocytes # (Auto)


 1.9 x10^3/uL


(1.0-4.8)


 


Monocytes # (Auto)


 0.4 x10^3/uL


(0.0-1.1)


 


Eosinophils # (Auto)


 0.3 x10^3/uL


(0.0-0.7)


 


Basophils # (Auto)


 0.0 x10^3/uL


(0.0-0.2)


 


Sodium Level


 144 mmol/L


(136-145)


 


Potassium Level


 3.8 mmol/L


(3.5-5.1)


 


Chloride Level


 112 mmol/L


()


 


Carbon Dioxide Level


 25 mmol/L


(21-32)


 


Anion Gap 7 (6-14) 


 


Blood Urea Nitrogen


 11 mg/dL


(7-20)


 


Creatinine


 1.2 mg/dL


(0.6-1.0)


 


Estimated GFR


(Cockcroft-Gault) 53.4 





 


Glucose Level


 94 mg/dL


(70-99)


 


Calcium Level


 8.7 mg/dL


(8.5-10.1)








Brief Hospital Course


Ms Lutz is a 73yo female with PMHx HTN and schizophrenia who comes to ED 

via EMS c/o chest pain that woke her up out of sleep.


Chest pain and cough has been going past day prior to arrival the pain gets 

worse.  Woke her from sleep.  She notes her cough is nonproductive


Labs with WBC 5, Hb 10.2, platelets 237, PT 12.6, INR 1, PTT 29, D-dimer 1.39, 

, K2.8, BUN 8, CR 1.2, glucose 104, calcium 8.2, bilirubin 0.3, AST 30, 

ALT 32, alkaline phosphatase 113, albumin 3.2, lipase 284, TSH 2.033, NT proBNP 

116, initial troponin was 27 and repeat is 26.  Urinalysis bland.  Chest 

radiograph with no acute abnormalities.  Due to elevated D-dimer she underwent 

CTPA which was negative for pulmonary embolism.


EKG my interpretation sinus rhythm rate of 64 bpm with leftward axis IA interval

212  first degree heart block, no ST segment elevations no T WI.


After initial evaluation potassium replacement.  Chest pain has improved.  She 

notes she does not know all her medications that she follows with [facility] and gets all her medications sent to her and blister 

packs.  She does note she had a telemedicine visit and had a medication change 

recently one of them made her vomit she does not know which.





5/13: Chest pain resolved.  She has been intermittently going into complete 

heart block.  Evaluated by cardiology recommended for permanent pacemaker 

placement she refuses.  I discussed with family they noted they will  her

and she is amenable to having a outpatient cardiac monitor and home health in 

the interim potassium improved and adjusted medications to hold nifedipine.





Consults: Cardiology





Problem list:


3rd degree heart block - intermittent. Needs outpatient monitoring and likely 

definitive treatment


Chest pain -likely due to above cardiology consulted.  Has seen Dr. Mccall 

outpatient. Telemetry.


Vomiting - x1.  Possibly due to pain.  Resolved


Hypertension  -given dry cough and renal insufficiency will hold losartan and 

start amlodipine.


Schizophrenia -on Geodon and trazodone for auditory hallucinations.  On 

benztropine.  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


Hypokalemia -replace


Weakness - therapy eval, she lives alone other than behavioral health does not 

know what of the services she has, she is amenable to home health





Greater than 30 minutes spent on d/c home with home health





Discharge Information


Condition at Discharge:  Improved


Follow Up:  Weeks (1)


Disposition/Orders:  D/C to Home w/ HH


Scheduled


Benztropine Mesylate (Benztropine Mesylate) 1 Mg Tablet, 1 TAB PO QHS for 

tremors, #60 (Reported)


   Entered as Reported by: SINTIA ELIZABETH RN on 5/12/22 0940


   Last Action: Continued on 5/12/22 1247 by COBY PEREZ MD


Haloperidol Decanoate (Haloperidol Decanoate) 100 Mg/1 Ml Vial, 100 MG IM Q4WK 

for Schitzophrenia , (Reported)


   Entered as Reported by: SINTIA ELIZABETH RN on 5/12/22 0940


   Last Action: New Order on 5/12/22 0940 by SINTIA ELIZABETH RN


Losartan Potassium (Losartan Potassium) 100 Mg Tablet, 100 MG PO DAILY, 

(Reported)


   Entered as Reported by: VIRI ANDERSON on 5/14/15 1219


   Last Action: Converted on 5/12/22 0714 by COBY PEREZ MD


Trazodone Hcl (Trazodone Hcl) 150 Mg Tablet, 1 TAB PO QHS for Sleep,anxiety,and 

depression for 30 Days, #30 Ref 0 (Reported)


   Entered as Reported by: SINTIA ELIZABETH RN on 5/12/22 0940


   Last Action: New Order on 5/12/22 0940 by SINTIA ELIZABETH RN





Miscellaneous Medications


[cogentin]  , (Reported)


   Entered as Reported by: SINTIA ELIZABETH RN on 5/12/22 0940


   Last Action: New Order on 5/12/22 0940 by SINTIA ELIZABETH RN





Discontinued Medications


Nifedipine (Nifedipine Er) 30 Mg Tab.er.24, 1 TAB PO DAILY for High blood 

pressure, #90 Ref 1 (Reported)


   Entered as Reported by: SINTIA ELIZABETH RN on 5/12/22 0914


   Last Action: New Order on 5/12/22 0914 by SINTIA ELIZABETH RN





Justicifation of Admission Dx:


Justifications for Admission:


Justification of Admission Dx:  No











COBY PEREZ MD        May 13, 2022 11:21